# Patient Record
Sex: MALE | Race: WHITE | NOT HISPANIC OR LATINO | Employment: OTHER | URBAN - METROPOLITAN AREA
[De-identification: names, ages, dates, MRNs, and addresses within clinical notes are randomized per-mention and may not be internally consistent; named-entity substitution may affect disease eponyms.]

---

## 2023-06-19 ENCOUNTER — TELEPHONE (OUTPATIENT)
Dept: PRIMARY CARE | Facility: CLINIC | Age: 68
End: 2023-06-19
Payer: MEDICARE

## 2023-06-19 DIAGNOSIS — Z12.5 PROSTATE CANCER SCREENING: ICD-10-CM

## 2023-06-19 DIAGNOSIS — R73.9 HYPERGLYCEMIA: ICD-10-CM

## 2023-06-19 DIAGNOSIS — R53.83 FATIGUE, UNSPECIFIED TYPE: ICD-10-CM

## 2023-06-19 DIAGNOSIS — Z00.00 WELL ADULT EXAM: Primary | ICD-10-CM

## 2023-06-20 ENCOUNTER — LAB (OUTPATIENT)
Dept: LAB | Facility: LAB | Age: 68
End: 2023-06-20
Payer: MEDICARE

## 2023-06-20 ENCOUNTER — TELEPHONE (OUTPATIENT)
Dept: PRIMARY CARE | Facility: CLINIC | Age: 68
End: 2023-06-20

## 2023-06-20 DIAGNOSIS — D64.9 ANEMIA, UNSPECIFIED TYPE: Primary | ICD-10-CM

## 2023-06-20 DIAGNOSIS — R53.83 FATIGUE, UNSPECIFIED TYPE: ICD-10-CM

## 2023-06-20 DIAGNOSIS — R73.9 HYPERGLYCEMIA: ICD-10-CM

## 2023-06-20 DIAGNOSIS — Z12.5 PROSTATE CANCER SCREENING: ICD-10-CM

## 2023-06-20 DIAGNOSIS — Z00.00 WELL ADULT EXAM: ICD-10-CM

## 2023-06-20 LAB
ALANINE AMINOTRANSFERASE (SGPT) (U/L) IN SER/PLAS: 17 U/L (ref 10–52)
ALBUMIN (G/DL) IN SER/PLAS: 4.3 G/DL (ref 3.4–5)
ALKALINE PHOSPHATASE (U/L) IN SER/PLAS: 61 U/L (ref 33–136)
ANION GAP IN SER/PLAS: 11 MMOL/L (ref 10–20)
ASPARTATE AMINOTRANSFERASE (SGOT) (U/L) IN SER/PLAS: 15 U/L (ref 9–39)
BILIRUBIN TOTAL (MG/DL) IN SER/PLAS: 0.4 MG/DL (ref 0–1.2)
CALCIUM (MG/DL) IN SER/PLAS: 9.4 MG/DL (ref 8.6–10.3)
CARBON DIOXIDE, TOTAL (MMOL/L) IN SER/PLAS: 29 MMOL/L (ref 21–32)
CHLORIDE (MMOL/L) IN SER/PLAS: 104 MMOL/L (ref 98–107)
CHOLESTEROL (MG/DL) IN SER/PLAS: 194 MG/DL (ref 0–199)
CHOLESTEROL IN HDL (MG/DL) IN SER/PLAS: 46.2 MG/DL
CHOLESTEROL/HDL RATIO: 4.2
CREATININE (MG/DL) IN SER/PLAS: 1.07 MG/DL (ref 0.5–1.3)
ERYTHROCYTE DISTRIBUTION WIDTH (RATIO) BY AUTOMATED COUNT: 13.3 % (ref 11.5–14.5)
ERYTHROCYTE MEAN CORPUSCULAR HEMOGLOBIN CONCENTRATION (G/DL) BY AUTOMATED: 30.9 G/DL (ref 32–36)
ERYTHROCYTE MEAN CORPUSCULAR VOLUME (FL) BY AUTOMATED COUNT: 97 FL (ref 80–100)
ERYTHROCYTES (10*6/UL) IN BLOOD BY AUTOMATED COUNT: 4.06 X10E12/L (ref 4.5–5.9)
ESTIMATED AVERAGE GLUCOSE FOR HBA1C: 114 MG/DL
GFR MALE: 77 ML/MIN/1.73M2
GLUCOSE (MG/DL) IN SER/PLAS: 85 MG/DL (ref 74–99)
HEMATOCRIT (%) IN BLOOD BY AUTOMATED COUNT: 39.5 % (ref 41–52)
HEMOGLOBIN (G/DL) IN BLOOD: 12.2 G/DL (ref 13.5–17.5)
HEMOGLOBIN A1C/HEMOGLOBIN TOTAL IN BLOOD: 5.6 %
LDL: 116 MG/DL (ref 0–99)
LEUKOCYTES (10*3/UL) IN BLOOD BY AUTOMATED COUNT: 5.2 X10E9/L (ref 4.4–11.3)
PLATELETS (10*3/UL) IN BLOOD AUTOMATED COUNT: 288 X10E9/L (ref 150–450)
POTASSIUM (MMOL/L) IN SER/PLAS: 4.4 MMOL/L (ref 3.5–5.3)
PROSTATE SPECIFIC AG (NG/ML) IN SER/PLAS: 1.97 NG/ML (ref 0–4)
PROTEIN TOTAL: 6.9 G/DL (ref 6.4–8.2)
SODIUM (MMOL/L) IN SER/PLAS: 140 MMOL/L (ref 136–145)
THYROTROPIN (MIU/L) IN SER/PLAS BY DETECTION LIMIT <= 0.05 MIU/L: 2.4 MIU/L (ref 0.44–3.98)
TRIGLYCERIDE (MG/DL) IN SER/PLAS: 158 MG/DL (ref 0–149)
UREA NITROGEN (MG/DL) IN SER/PLAS: 19 MG/DL (ref 6–23)
VLDL: 32 MG/DL (ref 0–40)

## 2023-06-20 PROCEDURE — 83036 HEMOGLOBIN GLYCOSYLATED A1C: CPT

## 2023-06-20 PROCEDURE — 36415 COLL VENOUS BLD VENIPUNCTURE: CPT

## 2023-06-20 PROCEDURE — 85027 COMPLETE CBC AUTOMATED: CPT

## 2023-06-20 PROCEDURE — 80061 LIPID PANEL: CPT

## 2023-06-20 PROCEDURE — 82607 VITAMIN B-12: CPT

## 2023-06-20 PROCEDURE — 83540 ASSAY OF IRON: CPT

## 2023-06-20 PROCEDURE — G0103 PSA SCREENING: HCPCS

## 2023-06-20 PROCEDURE — 83550 IRON BINDING TEST: CPT

## 2023-06-20 PROCEDURE — 84443 ASSAY THYROID STIM HORMONE: CPT

## 2023-06-20 PROCEDURE — 80053 COMPREHEN METABOLIC PANEL: CPT

## 2023-06-20 PROCEDURE — 82728 ASSAY OF FERRITIN: CPT

## 2023-06-21 LAB
COBALAMIN (VITAMIN B12) (PG/ML) IN SER/PLAS: 2442 PG/ML (ref 211–911)
FERRITIN (UG/LL) IN SER/PLAS: 38 UG/L (ref 20–300)
IRON (UG/DL) IN SER/PLAS: 95 UG/DL (ref 35–150)
IRON BINDING CAPACITY (UG/DL) IN SER/PLAS: 391 UG/DL (ref 240–445)
IRON SATURATION (%) IN SER/PLAS: 24 % (ref 25–45)

## 2023-06-22 ENCOUNTER — OFFICE VISIT (OUTPATIENT)
Dept: PRIMARY CARE | Facility: CLINIC | Age: 68
End: 2023-06-22
Payer: MEDICARE

## 2023-06-22 VITALS
TEMPERATURE: 97.6 F | BODY MASS INDEX: 31.4 KG/M2 | WEIGHT: 212 LBS | HEART RATE: 82 BPM | HEIGHT: 69 IN | DIASTOLIC BLOOD PRESSURE: 76 MMHG | RESPIRATION RATE: 18 BRPM | SYSTOLIC BLOOD PRESSURE: 120 MMHG

## 2023-06-22 DIAGNOSIS — K21.9 GASTROESOPHAGEAL REFLUX DISEASE WITHOUT ESOPHAGITIS: ICD-10-CM

## 2023-06-22 DIAGNOSIS — G23.1 PSP (PROGRESSIVE SUPRANUCLEAR PALSY) (MULTI): ICD-10-CM

## 2023-06-22 DIAGNOSIS — G95.20 CERVICAL SPINAL CORD COMPRESSION (MULTI): ICD-10-CM

## 2023-06-22 DIAGNOSIS — Z00.00 WELCOME TO MEDICARE PREVENTIVE VISIT: Primary | ICD-10-CM

## 2023-06-22 DIAGNOSIS — N32.89 BLADDER SPASM: ICD-10-CM

## 2023-06-22 PROBLEM — M25.619 STIFFNESS OF SHOULDER JOINT: Status: RESOLVED | Noted: 2023-06-22 | Resolved: 2023-06-22

## 2023-06-22 PROBLEM — M41.9 SCOLIOSIS OF LUMBOSACRAL SPINE: Status: ACTIVE | Noted: 2023-06-22

## 2023-06-22 PROBLEM — E55.9 VITAMIN D DEFICIENCY: Status: ACTIVE | Noted: 2022-08-01

## 2023-06-22 PROBLEM — G95.9 CERVICAL MYELOPATHY (MULTI): Status: ACTIVE | Noted: 2023-06-22

## 2023-06-22 PROBLEM — M47.812 SPONDYLOSIS OF CERVICAL SPINE: Status: ACTIVE | Noted: 2023-06-22

## 2023-06-22 PROBLEM — R47.89: Status: ACTIVE | Noted: 2023-03-22

## 2023-06-22 PROBLEM — M25.562 LEFT MEDIAL KNEE PAIN: Status: RESOLVED | Noted: 2023-06-22 | Resolved: 2023-06-22

## 2023-06-22 PROBLEM — H61.20 IMPACTED CERUMEN: Status: RESOLVED | Noted: 2023-06-22 | Resolved: 2023-06-22

## 2023-06-22 PROBLEM — G56.03 CARPAL TUNNEL SYNDROME, BILATERAL: Status: ACTIVE | Noted: 2023-06-22

## 2023-06-22 PROBLEM — R13.10 DYSPHAGIA, UNSPECIFIED: Status: RESOLVED | Noted: 2023-06-22 | Resolved: 2023-06-22

## 2023-06-22 PROBLEM — J01.90 ACUTE SINUSITIS: Status: RESOLVED | Noted: 2023-06-22 | Resolved: 2023-06-22

## 2023-06-22 PROBLEM — H25.099 INCIPIENT SENILE CATARACT: Status: ACTIVE | Noted: 2023-06-22

## 2023-06-22 PROBLEM — M19.90 OSTEOARTHRITIS: Status: ACTIVE | Noted: 2023-06-22

## 2023-06-22 PROBLEM — M17.0 PRIMARY OSTEOARTHRITIS OF BOTH KNEES: Status: ACTIVE | Noted: 2017-12-18

## 2023-06-22 PROBLEM — R53.1 RIGHT SIDED WEAKNESS: Status: RESOLVED | Noted: 2023-06-22 | Resolved: 2023-06-22

## 2023-06-22 PROBLEM — R47.1 DYSARTHRIA: Status: ACTIVE | Noted: 2023-03-22

## 2023-06-22 PROBLEM — D50.9 IRON DEFICIENCY ANEMIA: Status: RESOLVED | Noted: 2020-10-16 | Resolved: 2023-06-22

## 2023-06-22 PROBLEM — M25.551 RIGHT HIP PAIN: Status: RESOLVED | Noted: 2017-12-18 | Resolved: 2023-06-22

## 2023-06-22 PROBLEM — R27.8 COORDINATION IMPAIRMENT: Status: ACTIVE | Noted: 2023-06-22

## 2023-06-22 PROBLEM — J31.0 RHINITIS: Status: RESOLVED | Noted: 2020-10-16 | Resolved: 2023-06-22

## 2023-06-22 PROBLEM — R29.818: Status: ACTIVE | Noted: 2023-03-22

## 2023-06-22 PROBLEM — T39.395A: Status: ACTIVE | Noted: 2023-06-22

## 2023-06-22 PROBLEM — R53.83 FATIGUE: Status: RESOLVED | Noted: 2020-10-16 | Resolved: 2023-06-22

## 2023-06-22 PROBLEM — M75.41 IMPINGEMENT SYNDROME OF RIGHT SHOULDER: Status: RESOLVED | Noted: 2023-06-22 | Resolved: 2023-06-22

## 2023-06-22 PROBLEM — G20.A1 HYPOKINETIC PARKINSONIAN DYSPHONIA (MULTI): Status: ACTIVE | Noted: 2023-03-22

## 2023-06-22 PROBLEM — Z98.1 STATUS POST CERVICAL SPINAL FUSION: Status: ACTIVE | Noted: 2023-06-22

## 2023-06-22 PROBLEM — M25.9 DISORDER OF SHOULDER: Status: ACTIVE | Noted: 2023-06-22

## 2023-06-22 PROBLEM — R41.89 COGNITIVE IMPAIRMENT: Status: ACTIVE | Noted: 2023-06-22

## 2023-06-22 PROBLEM — R73.03 PREDIABETES: Status: ACTIVE | Noted: 2022-08-01

## 2023-06-22 PROBLEM — L57.0 ACTINIC KERATOSIS: Status: RESOLVED | Noted: 2023-06-22 | Resolved: 2023-06-22

## 2023-06-22 PROBLEM — M17.12 OSTEOARTHRITIS OF LEFT KNEE: Status: ACTIVE | Noted: 2022-08-01

## 2023-06-22 PROBLEM — F51.04 CHRONIC INSOMNIA: Status: ACTIVE | Noted: 2023-06-22

## 2023-06-22 PROBLEM — H10.9 CONJUNCTIVITIS, LEFT EYE: Status: RESOLVED | Noted: 2023-06-22 | Resolved: 2023-06-22

## 2023-06-22 PROBLEM — R49.8 OTHER VOICE AND RESONANCE DISORDERS: Status: ACTIVE | Noted: 2023-06-22

## 2023-06-22 PROBLEM — R49.0 HYPOKINETIC PARKINSONIAN DYSPHONIA (MULTI): Status: ACTIVE | Noted: 2023-03-22

## 2023-06-22 PROBLEM — R42 DIZZINESS: Status: RESOLVED | Noted: 2023-06-22 | Resolved: 2023-06-22

## 2023-06-22 PROBLEM — I45.10 RBBB (RIGHT BUNDLE BRANCH BLOCK): Status: ACTIVE | Noted: 2022-08-01

## 2023-06-22 PROBLEM — G24.9 DYSKINESIA: Status: ACTIVE | Noted: 2023-06-22

## 2023-06-22 PROBLEM — M48.02 CERVICAL SPINAL STENOSIS: Status: ACTIVE | Noted: 2022-08-01

## 2023-06-22 PROBLEM — D64.9 ANEMIA: Status: ACTIVE | Noted: 2023-06-22

## 2023-06-22 PROBLEM — F32.A DEPRESSION: Status: ACTIVE | Noted: 2020-10-16

## 2023-06-22 PROBLEM — R26.9 GAIT DISORDER: Status: ACTIVE | Noted: 2023-06-22

## 2023-06-22 PROBLEM — N45.1 EPIDIDYMITIS: Status: ACTIVE | Noted: 2023-06-22

## 2023-06-22 PROBLEM — M54.50 LOW BACK PAIN: Status: RESOLVED | Noted: 2020-10-16 | Resolved: 2023-06-22

## 2023-06-22 PROBLEM — L03.221 CELLULITIS OF NECK: Status: RESOLVED | Noted: 2023-06-22 | Resolved: 2023-06-22

## 2023-06-22 PROBLEM — M72.2 PLANTAR FASCIITIS, LEFT: Status: ACTIVE | Noted: 2023-06-22

## 2023-06-22 PROBLEM — R29.2 HOFFMAN SIGN PRESENT: Status: RESOLVED | Noted: 2023-06-22 | Resolved: 2023-06-22

## 2023-06-22 PROBLEM — M62.830 SPASM OF MUSCLE OF LOWER BACK: Status: RESOLVED | Noted: 2023-06-22 | Resolved: 2023-06-22

## 2023-06-22 PROBLEM — K25.9: Status: ACTIVE | Noted: 2023-06-22

## 2023-06-22 PROBLEM — M25.511 SHOULDER PAIN, RIGHT: Status: RESOLVED | Noted: 2020-10-16 | Resolved: 2023-06-22

## 2023-06-22 PROBLEM — R45.86 MOOD DISTURBANCE: Status: ACTIVE | Noted: 2023-06-22

## 2023-06-22 PROBLEM — H66.90 OTITIS MEDIA, ACUTE: Status: RESOLVED | Noted: 2023-06-22 | Resolved: 2023-06-22

## 2023-06-22 PROBLEM — R09.82 POST-NASAL DRIP: Status: RESOLVED | Noted: 2023-06-22 | Resolved: 2023-06-22

## 2023-06-22 PROBLEM — M17.10 ARTHRITIS OF KNEE: Status: RESOLVED | Noted: 2023-06-22 | Resolved: 2023-06-22

## 2023-06-22 PROBLEM — M16.11 OSTEOARTHRITIS OF RIGHT HIP: Status: ACTIVE | Noted: 2022-08-01

## 2023-06-22 PROBLEM — H51.11 CONVERGENCE INSUFFICIENCY: Status: ACTIVE | Noted: 2023-06-22

## 2023-06-22 PROBLEM — M54.2 NECK PAIN: Status: RESOLVED | Noted: 2023-06-22 | Resolved: 2023-06-22

## 2023-06-22 PROBLEM — M19.019 OSTEOARTHRITIS OF GLENOHUMERAL JOINT: Status: ACTIVE | Noted: 2017-12-18

## 2023-06-22 PROBLEM — E66.9 OBESITY: Status: ACTIVE | Noted: 2023-06-22

## 2023-06-22 PROBLEM — R47.9 DIFFICULTY WITH SPEECH: Status: ACTIVE | Noted: 2023-06-22

## 2023-06-22 PROCEDURE — 1126F AMNT PAIN NOTED NONE PRSNT: CPT | Performed by: FAMILY MEDICINE

## 2023-06-22 PROCEDURE — 1170F FXNL STATUS ASSESSED: CPT | Performed by: FAMILY MEDICINE

## 2023-06-22 PROCEDURE — 1159F MED LIST DOCD IN RCRD: CPT | Performed by: FAMILY MEDICINE

## 2023-06-22 PROCEDURE — G0402 INITIAL PREVENTIVE EXAM: HCPCS | Performed by: FAMILY MEDICINE

## 2023-06-22 PROCEDURE — 1036F TOBACCO NON-USER: CPT | Performed by: FAMILY MEDICINE

## 2023-06-22 PROCEDURE — 99213 OFFICE O/P EST LOW 20 MIN: CPT | Performed by: FAMILY MEDICINE

## 2023-06-22 PROCEDURE — 3008F BODY MASS INDEX DOCD: CPT | Performed by: FAMILY MEDICINE

## 2023-06-22 RX ORDER — CARBIDOPA AND LEVODOPA 25; 100 MG/1; MG/1
TABLET ORAL
COMMUNITY
End: 2023-06-22 | Stop reason: ALTCHOICE

## 2023-06-22 RX ORDER — ACETAMINOPHEN 500 MG
2 TABLET ORAL 3 TIMES DAILY
COMMUNITY
Start: 2022-08-15

## 2023-06-22 RX ORDER — OMEPRAZOLE 40 MG/1
40 CAPSULE, DELAYED RELEASE ORAL
Qty: 90 CAPSULE | Refills: 3 | Status: SHIPPED | OUTPATIENT
Start: 2023-06-22 | End: 2024-04-10

## 2023-06-22 RX ORDER — MULTIVIT WITH MINERALS/HERBS
TABLET ORAL
COMMUNITY
End: 2023-06-22 | Stop reason: ALTCHOICE

## 2023-06-22 RX ORDER — IBUPROFEN 100 MG/5ML
SUSPENSION, ORAL (FINAL DOSE FORM) ORAL
COMMUNITY

## 2023-06-22 RX ORDER — GLUCOSAMINE/CHONDR SU A SOD 750-600 MG
TABLET ORAL
COMMUNITY
End: 2023-06-22 | Stop reason: ALTCHOICE

## 2023-06-22 RX ORDER — SERTRALINE HYDROCHLORIDE 50 MG/1
TABLET, FILM COATED ORAL
COMMUNITY
Start: 2022-12-08 | End: 2023-06-22 | Stop reason: ALTCHOICE

## 2023-06-22 RX ORDER — OMEPRAZOLE 20 MG/1
TABLET, DELAYED RELEASE ORAL
COMMUNITY
End: 2023-06-22 | Stop reason: ALTCHOICE

## 2023-06-22 RX ORDER — DEXTROMETHORPHAN HYDROBROMIDE, GUAIFENESIN 5; 100 MG/5ML; MG/5ML
LIQUID ORAL EVERY 8 HOURS PRN
COMMUNITY

## 2023-06-22 RX ORDER — GABAPENTIN 300 MG/1
CAPSULE ORAL
COMMUNITY
Start: 2021-03-23 | End: 2023-10-17 | Stop reason: SDUPTHER

## 2023-06-22 RX ORDER — CETIRIZINE HYDROCHLORIDE 10 MG/1
1 TABLET ORAL DAILY
COMMUNITY
Start: 2020-10-02

## 2023-06-22 RX ORDER — HYOSCYAMINE SULFATE 0.125 MG
0.12 TABLET ORAL 2 TIMES DAILY
Qty: 20 TABLET | Refills: 0 | Status: SHIPPED | OUTPATIENT
Start: 2023-06-22 | End: 2023-08-04 | Stop reason: SDUPTHER

## 2023-06-22 RX ORDER — OMEPRAZOLE 40 MG/1
40 CAPSULE, DELAYED RELEASE ORAL
COMMUNITY
End: 2023-06-22 | Stop reason: SDUPTHER

## 2023-06-22 RX ORDER — RIVASTIGMINE 4.6 MG/24H
1 PATCH, EXTENDED RELEASE TRANSDERMAL DAILY
COMMUNITY
Start: 2022-12-08 | End: 2023-06-22 | Stop reason: ALTCHOICE

## 2023-06-22 ASSESSMENT — ENCOUNTER SYMPTOMS
HEMATOLOGIC/LYMPHATIC NEGATIVE: 1
RESPIRATORY NEGATIVE: 1
CONSTITUTIONAL NEGATIVE: 1
PSYCHIATRIC NEGATIVE: 1
GASTROINTESTINAL NEGATIVE: 1
ALLERGIC/IMMUNOLOGIC NEGATIVE: 1
DIZZINESS: 1
ARTHRALGIAS: 1
WEAKNESS: 1
SPEECH DIFFICULTY: 1
ENDOCRINE NEGATIVE: 1
FREQUENCY: 1
LIGHT-HEADEDNESS: 1
CARDIOVASCULAR NEGATIVE: 1
MYALGIAS: 1

## 2023-06-22 ASSESSMENT — ACTIVITIES OF DAILY LIVING (ADL)
TAKING_MEDICATION: INDEPENDENT
BATHING: INDEPENDENT
DRESSING: INDEPENDENT
MANAGING_FINANCES: INDEPENDENT
DOING_HOUSEWORK: NEEDS ASSISTANCE
GROCERY_SHOPPING: INDEPENDENT

## 2023-06-22 ASSESSMENT — PATIENT HEALTH QUESTIONNAIRE - PHQ9
SUM OF ALL RESPONSES TO PHQ9 QUESTIONS 1 AND 2: 0
1. LITTLE INTEREST OR PLEASURE IN DOING THINGS: NOT AT ALL
2. FEELING DOWN, DEPRESSED OR HOPELESS: NOT AT ALL

## 2023-06-22 NOTE — PROGRESS NOTES
"Subjective   Reason for Visit: Jony Panda is an 67 y.o. male here for a Medicare Wellness visit.     Past Medical, Surgical, and Family History reviewed and updated in chart.    Reviewed all medications by prescribing practitioner or clinical pharmacist (such as prescriptions, OTCs, herbal therapies and supplements) and documented in the medical record.    HPI    Patient Care Team:  Eulogio Duvall DO as PCP - General     Review of Systems   Constitutional: Negative.    HENT: Negative.     Eyes:  Positive for visual disturbance.   Respiratory: Negative.     Cardiovascular: Negative.    Gastrointestinal: Negative.    Endocrine: Negative.    Genitourinary:  Positive for frequency and urgency.   Musculoskeletal:  Positive for arthralgias and myalgias.   Skin: Negative.    Allergic/Immunologic: Negative.    Neurological:  Positive for dizziness, speech difficulty, weakness and light-headedness.   Hematological: Negative.    Psychiatric/Behavioral: Negative.         Objective   Vitals:  /76   Pulse 82   Temp 36.4 °C (97.6 °F)   Resp 18   Ht 1.753 m (5' 9\")   Wt 96.2 kg (212 lb)   BMI 31.31 kg/m²       Physical Exam  Vitals and nursing note reviewed.   Constitutional:       Appearance: Normal appearance.   HENT:      Head: Normocephalic and atraumatic.      Nose: Nose normal.      Mouth/Throat:      Pharynx: Oropharynx is clear.   Eyes:      Extraocular Movements: Extraocular movements intact.      Conjunctiva/sclera: Conjunctivae normal.      Pupils: Pupils are equal, round, and reactive to light.   Neck:      Vascular: No carotid bruit.   Cardiovascular:      Rate and Rhythm: Normal rate and regular rhythm.      Pulses: Normal pulses.      Heart sounds: Normal heart sounds.   Pulmonary:      Effort: Pulmonary effort is normal. No respiratory distress.      Breath sounds: Normal breath sounds. No wheezing, rhonchi or rales.   Abdominal:      General: Abdomen is flat. Bowel sounds are normal. There is no " distension.      Palpations: Abdomen is soft.      Tenderness: There is no abdominal tenderness.      Hernia: No hernia is present.   Musculoskeletal:         General: No swelling or tenderness. Normal range of motion.      Cervical back: Normal range of motion and neck supple. No tenderness.   Lymphadenopathy:      Cervical: No cervical adenopathy.   Skin:     General: Skin is warm and dry.      Capillary Refill: Capillary refill takes less than 2 seconds.   Neurological:      Mental Status: He is alert and oriented to person, place, and time.      Cranial Nerves: No cranial nerve deficit.      Motor: Weakness present.      Coordination: Coordination abnormal.      Gait: Gait abnormal.   Psychiatric:         Attention and Perception: Attention and perception normal.         Mood and Affect: Mood normal.         Behavior: Behavior normal.         Thought Content: Thought content normal.         Judgment: Judgment normal.         Assessment/Plan   1. Medicare annual wellness visit, subsequent      2. Well adult exam      3. Gastroesophageal reflux disease without esophagitis    - omeprazole (PriLOSEC) 40 mg DR capsule; Take 1 capsule (40 mg) by mouth once daily.  Dispense: 90 capsule; Refill: 3    4. Bladder spasm    - hyoscyamine (Anaspaz, Levsin) 0.125 mg tablet; Take 1 tablet (0.125 mg) by mouth 2 times a day for 10 days.  Dispense: 20 tablet; Refill: 0    5. PSP (progressive supranuclear palsy) (CMS/HCC)      6. Cervical spinal cord compression (CMS/HCC)

## 2023-08-04 ENCOUNTER — TELEPHONE (OUTPATIENT)
Dept: PEDIATRICS | Facility: CLINIC | Age: 68
End: 2023-08-04
Payer: MEDICARE

## 2023-08-04 DIAGNOSIS — N32.89 BLADDER SPASM: ICD-10-CM

## 2023-08-04 DIAGNOSIS — K21.9 GASTROESOPHAGEAL REFLUX DISEASE WITHOUT ESOPHAGITIS: ICD-10-CM

## 2023-08-04 NOTE — TELEPHONE ENCOUNTER
Spoke with wife. Prilosec was sent to [facility] wife is calling to see what happened.    Requesting hycosamine sent to local pharmacy.    Please review and advise    Requested Prescriptions     Pending Prescriptions Disp Refills    hyoscyamine (Anaspaz, Levsin) 0.125 mg tablet 20 tablet 0     Sig: Take 1 tablet (0.125 mg) by mouth 2 times a day for 10 days.

## 2023-08-04 NOTE — TELEPHONE ENCOUNTER
Patient wife call patient had an appointment in June, was told by patient wife that he was supposed to get 2 scripts for bladder/belly issues for him, patient wife said that she called a few times and no one has called her back. Thank you. Please call her back [phone]

## 2023-08-07 RX ORDER — HYOSCYAMINE SULFATE 0.125 MG
0.12 TABLET ORAL 2 TIMES DAILY
Qty: 20 TABLET | Refills: 3 | Status: SHIPPED | OUTPATIENT
Start: 2023-08-07 | End: 2023-08-21 | Stop reason: SDUPTHER

## 2023-08-14 ENCOUNTER — TELEPHONE (OUTPATIENT)
Dept: PRIMARY CARE | Facility: CLINIC | Age: 68
End: 2023-08-14
Payer: MEDICARE

## 2023-08-14 NOTE — TELEPHONE ENCOUNTER
Pt wife called and said Jony was seen on June 22nd for a welcome to medicare PX but visit was billed as a Annual Medicare PX. Can this be changed and resubmitted to insurance?

## 2023-08-21 DIAGNOSIS — N32.89 BLADDER SPASM: ICD-10-CM

## 2023-08-21 RX ORDER — HYOSCYAMINE SULFATE 0.125 MG
0.12 TABLET ORAL 2 TIMES DAILY
Qty: 20 TABLET | Refills: 3 | Status: SHIPPED | OUTPATIENT
Start: 2023-08-21 | End: 2023-09-30

## 2023-08-21 NOTE — TELEPHONE ENCOUNTER
Requested Prescriptions     Pending Prescriptions Disp Refills    hyoscyamine (Anaspaz, Levsin) 0.125 mg tablet 20 tablet 3     Sig: Take 1 tablet (0.125 mg) by mouth 2 times a day.

## 2023-09-11 ENCOUNTER — TELEPHONE (OUTPATIENT)
Dept: PRIMARY CARE | Facility: CLINIC | Age: 68
End: 2023-09-11
Payer: MEDICARE

## 2023-09-11 DIAGNOSIS — N32.89 BLADDER SPASM: Primary | ICD-10-CM

## 2023-09-11 RX ORDER — DICYCLOMINE HYDROCHLORIDE 20 MG/1
20 TABLET ORAL 2 TIMES DAILY
Qty: 180 TABLET | Refills: 3 | Status: SHIPPED | OUTPATIENT
Start: 2023-09-11 | End: 2024-09-10

## 2023-09-11 NOTE — TELEPHONE ENCOUNTER
Pt wife called (Katya) and Hyoscyamine is not covered by insurance, Dicyclomine is covered. Can this please be sent in to mail order pharm?

## 2023-09-25 ENCOUNTER — TELEPHONE (OUTPATIENT)
Dept: PRIMARY CARE | Facility: CLINIC | Age: 68
End: 2023-09-25
Payer: MEDICARE

## 2023-09-25 ENCOUNTER — TELEPHONE (OUTPATIENT)
Dept: PRIMARY CARE | Facility: CLINIC | Age: 68
End: 2023-09-25

## 2023-09-25 DIAGNOSIS — N32.89 BLADDER SPASM: Primary | ICD-10-CM

## 2023-09-25 RX ORDER — SOLIFENACIN SUCCINATE 5 MG/1
5 TABLET, FILM COATED ORAL DAILY
Qty: 30 TABLET | Refills: 11 | Status: SHIPPED | OUTPATIENT
Start: 2023-09-25 | End: 2023-10-05 | Stop reason: ALTCHOICE

## 2023-09-25 NOTE — TELEPHONE ENCOUNTER
Patient was seen by Neuro and he recommended his Hyoscyamine be switched to either Vesicare, Trostium or Mybertriq. He feels one of these will work better.  Can this be changed and sent in?

## 2023-10-05 ENCOUNTER — TELEPHONE (OUTPATIENT)
Dept: PRIMARY CARE | Facility: CLINIC | Age: 68
End: 2023-10-05
Payer: MEDICARE

## 2023-10-05 DIAGNOSIS — N32.89 BLADDER SPASM: Primary | ICD-10-CM

## 2023-10-05 RX ORDER — MIRABEGRON 25 MG/1
25 TABLET, FILM COATED, EXTENDED RELEASE ORAL DAILY
Qty: 30 TABLET | Refills: 1 | Status: SHIPPED | OUTPATIENT
Start: 2023-10-05 | End: 2023-12-04

## 2023-10-05 NOTE — TELEPHONE ENCOUNTER
Patients wife called in, stating that Jony started taking Solifenacin 5mg and it makes him very nauseated. Would like to know if pt can stop taking Solifenacin and try Myrbetriq instead.

## 2023-10-12 ENCOUNTER — TELEPHONE (OUTPATIENT)
Dept: PEDIATRICS | Facility: CLINIC | Age: 68
End: 2023-10-12
Payer: MEDICARE

## 2023-10-12 DIAGNOSIS — N32.89 BLADDER SPASM: Primary | ICD-10-CM

## 2023-10-12 RX ORDER — TROSPIUM CHLORIDE 20 MG/1
20 TABLET, FILM COATED ORAL 2 TIMES DAILY
Qty: 60 TABLET | Refills: 11 | Status: SHIPPED | OUTPATIENT
Start: 2023-10-12 | End: 2023-11-06

## 2023-10-12 NOTE — TELEPHONE ENCOUNTER
Patients wife called in stating that the Mybetriq that was sent in for patients bladder issues, is too strong and makes patient nauseous. Asked if patient is able to have an order for Trospium sent in.     To [facility] in [place].

## 2023-10-16 RX ORDER — GABAPENTIN 300 MG/1
CAPSULE ORAL
OUTPATIENT
Start: 2023-10-16

## 2023-10-17 DIAGNOSIS — G95.20 CERVICAL SPINAL CORD COMPRESSION (MULTI): Primary | ICD-10-CM

## 2023-10-17 RX ORDER — GABAPENTIN 300 MG/1
300 CAPSULE ORAL
Qty: 90 CAPSULE | Refills: 3 | OUTPATIENT
Start: 2023-10-17

## 2023-10-17 RX ORDER — GABAPENTIN 300 MG/1
300 CAPSULE ORAL NIGHTLY
Qty: 90 CAPSULE | Refills: 3 | Status: SHIPPED | OUTPATIENT
Start: 2023-10-17 | End: 2024-01-10 | Stop reason: SDUPTHER

## 2023-10-17 NOTE — TELEPHONE ENCOUNTER
Patient requests prescription below    Last Office Visit: 6/22/2023     Requested Prescriptions     Pending Prescriptions Disp Refills    gabapentin (Neurontin) 300 mg capsule 90 capsule 0     Sig: Take 1 capsule (300 mg) by mouth once daily at bedtime.

## 2023-12-13 ENCOUNTER — APPOINTMENT (OUTPATIENT)
Dept: PRIMARY CARE | Facility: CLINIC | Age: 68
End: 2023-12-13
Payer: MEDICARE

## 2024-01-10 DIAGNOSIS — G95.20 CERVICAL SPINAL CORD COMPRESSION (MULTI): ICD-10-CM

## 2024-01-10 RX ORDER — GABAPENTIN 300 MG/1
300 CAPSULE ORAL NIGHTLY
Qty: 90 CAPSULE | Refills: 3 | Status: SHIPPED | OUTPATIENT
Start: 2024-01-10

## 2024-01-10 NOTE — TELEPHONE ENCOUNTER
Requested Prescriptions     Pending Prescriptions Disp Refills    gabapentin (Neurontin) 300 mg capsule 90 capsule 3     Sig: Take 1 capsule (300 mg) by mouth once daily at bedtime.

## 2024-01-16 ENCOUNTER — TELEPHONE (OUTPATIENT)
Dept: PRIMARY CARE | Facility: CLINIC | Age: 69
End: 2024-01-16
Payer: MEDICARE

## 2024-01-16 DIAGNOSIS — L60.9 NAIL ABNORMALITIES: Primary | ICD-10-CM

## 2024-01-16 NOTE — TELEPHONE ENCOUNTER
Patient would like a referral to Podiatry for Nail care.    [facility]  Dr Esparza  Fax [phone]

## 2024-03-07 ENCOUNTER — HOME HEALTH ADMISSION (OUTPATIENT)
Dept: HOME HEALTH SERVICES | Facility: HOME HEALTH | Age: 69
End: 2024-03-07
Payer: MEDICARE

## 2024-03-08 ENCOUNTER — DOCUMENTATION (OUTPATIENT)
Dept: HOME HEALTH SERVICES | Facility: HOME HEALTH | Age: 69
End: 2024-03-08
Payer: MEDICARE

## 2024-03-08 NOTE — HH CARE COORDINATION
Home Care received a Referral for Nursing, Physical Therapy, Home Health Aide, and Speech Language Pathology. We have processed the referral for a Start of Care on 03.11.24 to 03.12.24.     If you have any questions or concerns, please feel free to contact us at [phone]. Follow the prompts, enter your five digit zip code, and you will be directed to your care team on [facility] 2.

## 2024-03-11 ENCOUNTER — HOME CARE VISIT (OUTPATIENT)
Dept: HOME HEALTH SERVICES | Facility: HOME HEALTH | Age: 69
End: 2024-03-11
Payer: MEDICARE

## 2024-03-11 VITALS
DIASTOLIC BLOOD PRESSURE: 68 MMHG | OXYGEN SATURATION: 99 % | HEART RATE: 68 BPM | RESPIRATION RATE: 20 BRPM | SYSTOLIC BLOOD PRESSURE: 118 MMHG | TEMPERATURE: 96.6 F

## 2024-03-11 PROCEDURE — 169592 NO-PAY CLAIM PROCEDURE

## 2024-03-11 PROCEDURE — G0299 HHS/HOSPICE OF RN EA 15 MIN: HCPCS | Mod: HHH

## 2024-03-11 PROCEDURE — 0023 HH SOC

## 2024-03-11 PROCEDURE — 1090000001 HH PPS REVENUE CREDIT

## 2024-03-11 PROCEDURE — 1090000002 HH PPS REVENUE DEBIT

## 2024-03-11 ASSESSMENT — PAIN SCALES - PAIN ASSESSMENT IN ADVANCED DEMENTIA (PAINAD)
TOTALSCORE: 0
CONSOLABILITY: 0
BODYLANGUAGE: 0
FACIALEXPRESSION: 0
BREATHING: 0
CONSOLABILITY: 0 - NO NEED TO CONSOLE.
NEGVOCALIZATION: 0
BODYLANGUAGE: 0 - RELAXED.
FACIALEXPRESSION: 0 - SMILING OR INEXPRESSIVE.
NEGVOCALIZATION: 0 - NONE.

## 2024-03-11 ASSESSMENT — ENCOUNTER SYMPTOMS
OCCASIONAL FEELINGS OF UNSTEADINESS: 0
HIGHEST PAIN SEVERITY IN PAST 24 HOURS: 2/10
PAIN SEVERITY GOAL: 0/10
PERSON REPORTING PAIN: PATIENT
PAIN: 1
LOWEST PAIN SEVERITY IN PAST 24 HOURS: 0/10
DEPRESSION: 0
APPETITE LEVEL: FAIR
PAIN LOCATION: BACK
PAIN LOCATION - PAIN FREQUENCY: INTERMITTENT
LOSS OF SENSATION IN FEET: 0
PAIN LOCATION - PAIN QUALITY: ACHES
PAIN LOCATION - PAIN SEVERITY: 2/10
SUBJECTIVE PAIN PROGRESSION: UNCHANGED
CHANGE IN APPETITE: UNCHANGED
LAST BOWEL MOVEMENT: 66910

## 2024-03-11 ASSESSMENT — ACTIVITIES OF DAILY LIVING (ADL)
OASIS_M1830: 05
ENTERING_EXITING_HOME: MODERATE ASSIST

## 2024-03-11 NOTE — HOME HEALTH
SKILLED NURSING VISIT FOR ADMISSION [facility] ASSESSMENT TEACHING OF MEDICATIONS AND DISEASE PROCESS. PT LIVES IN [facility] AND AMBULATES WITH WALKER. SPOUSE ASSISTS WITH ADLS DECLINES [facility] AIDE AT THIS TIME AND HAS HIRED CAREGIVER 2 X WEEK. PT AND SPOUSE ACCEPT PT OT AND SLP SERVICES. PT AND SPOUSE HAVE NO SKILLED NURSING NEEDS AT THIS TIME. SPOUSE REQUESTS STAFF CONTACT HER [phone] TO SCHEDULE VISITS.

## 2024-03-12 ENCOUNTER — HOME CARE VISIT (OUTPATIENT)
Dept: HOME HEALTH SERVICES | Facility: HOME HEALTH | Age: 69
End: 2024-03-12
Payer: MEDICARE

## 2024-03-12 VITALS — HEART RATE: 72 BPM | TEMPERATURE: 97.2 F | SYSTOLIC BLOOD PRESSURE: 120 MMHG | DIASTOLIC BLOOD PRESSURE: 80 MMHG

## 2024-03-12 PROCEDURE — G0151 HHCP-SERV OF PT,EA 15 MIN: HCPCS | Mod: HHH

## 2024-03-12 PROCEDURE — 1090000001 HH PPS REVENUE CREDIT

## 2024-03-12 PROCEDURE — 1090000002 HH PPS REVENUE DEBIT

## 2024-03-12 SDOH — HEALTH STABILITY: PHYSICAL HEALTH: EXERCISE COMMENTS: INSTR IN SEATED COORDINATION/NEURO ACTIVITIES/FRENKEL BASED

## 2024-03-12 ASSESSMENT — ENCOUNTER SYMPTOMS
DENIES PAIN: 1
OCCASIONAL FEELINGS OF UNSTEADINESS: 1

## 2024-03-12 ASSESSMENT — ACTIVITIES OF DAILY LIVING (ADL)
AMBULATION ASSISTANCE ON FLAT SURFACES: 1
AMBULATION_DISTANCE/DURATION_TOLERATED: 150

## 2024-03-13 ENCOUNTER — HOME CARE VISIT (OUTPATIENT)
Dept: HOME HEALTH SERVICES | Facility: HOME HEALTH | Age: 69
End: 2024-03-13
Payer: MEDICARE

## 2024-03-13 PROCEDURE — 1090000001 HH PPS REVENUE CREDIT

## 2024-03-13 PROCEDURE — 1090000002 HH PPS REVENUE DEBIT

## 2024-03-13 PROCEDURE — G0157 HHC PT ASSISTANT EA 15: HCPCS | Mod: HHH

## 2024-03-13 PROCEDURE — G0152 HHCP-SERV OF OT,EA 15 MIN: HCPCS | Mod: HHH

## 2024-03-13 ASSESSMENT — ACTIVITIES OF DAILY LIVING (ADL)
DRESSING_UB_CURRENT_FUNCTION: MINIMUM ASSIST
PHYSICAL TRANSFERS ASSESSED: 1
TOILETING: 1
AMBULATION ASSISTANCE: CONTACT GUARD ASSIST
AMBULATION ASSISTANCE: 1
TOILETING: MINIMUM ASSIST
CURRENT_FUNCTION: MINIMUM ASSIST

## 2024-03-13 ASSESSMENT — ENCOUNTER SYMPTOMS
PERSON REPORTING PAIN: PATIENT
DENIES PAIN: 1

## 2024-03-14 PROCEDURE — 1090000002 HH PPS REVENUE DEBIT

## 2024-03-14 PROCEDURE — 1090000001 HH PPS REVENUE CREDIT

## 2024-03-15 ENCOUNTER — HOME CARE VISIT (OUTPATIENT)
Dept: HOME HEALTH SERVICES | Facility: HOME HEALTH | Age: 69
End: 2024-03-15
Payer: MEDICARE

## 2024-03-15 VITALS — HEART RATE: 69 BPM | OXYGEN SATURATION: 100 % | TEMPERATURE: 97.9 F

## 2024-03-15 PROCEDURE — 1090000001 HH PPS REVENUE CREDIT

## 2024-03-15 PROCEDURE — G0153 HHCP-SVS OF S/L PATH,EA 15MN: HCPCS | Mod: HHH

## 2024-03-15 PROCEDURE — 1090000002 HH PPS REVENUE DEBIT

## 2024-03-15 ASSESSMENT — PAIN SCALES - PAIN ASSESSMENT IN ADVANCED DEMENTIA (PAINAD)
TOTALSCORE: 0
BODYLANGUAGE: 0
BODYLANGUAGE: 0 - RELAXED.
NEGVOCALIZATION: 0
NEGVOCALIZATION: 0 - NONE.
CONSOLABILITY: 0
BREATHING: 0
FACIALEXPRESSION: 0
FACIALEXPRESSION: 0 - SMILING OR INEXPRESSIVE.
CONSOLABILITY: 0 - NO NEED TO CONSOLE.

## 2024-03-15 ASSESSMENT — ENCOUNTER SYMPTOMS
PERSON REPORTING PAIN: PATIENT
AGGRESSION WITHIN DEFINED LIMITS: 1
ANGER WITHIN DEFINED LIMITS: 1
DENIES PAIN: 1

## 2024-03-16 PROCEDURE — 1090000001 HH PPS REVENUE CREDIT

## 2024-03-16 PROCEDURE — 1090000002 HH PPS REVENUE DEBIT

## 2024-03-17 PROCEDURE — 1090000001 HH PPS REVENUE CREDIT

## 2024-03-17 PROCEDURE — 1090000002 HH PPS REVENUE DEBIT

## 2024-03-18 PROCEDURE — 1090000002 HH PPS REVENUE DEBIT

## 2024-03-18 PROCEDURE — 1090000001 HH PPS REVENUE CREDIT

## 2024-03-19 ENCOUNTER — HOME CARE VISIT (OUTPATIENT)
Dept: HOME HEALTH SERVICES | Facility: HOME HEALTH | Age: 69
End: 2024-03-19
Payer: MEDICARE

## 2024-03-19 PROCEDURE — G0157 HHC PT ASSISTANT EA 15: HCPCS | Mod: HHH

## 2024-03-19 PROCEDURE — 1090000001 HH PPS REVENUE CREDIT

## 2024-03-19 PROCEDURE — 1090000002 HH PPS REVENUE DEBIT

## 2024-03-19 ASSESSMENT — ENCOUNTER SYMPTOMS
PERSON REPORTING PAIN: PATIENT
DENIES PAIN: 1

## 2024-03-20 ENCOUNTER — HOME CARE VISIT (OUTPATIENT)
Dept: HOME HEALTH SERVICES | Facility: HOME HEALTH | Age: 69
End: 2024-03-20
Payer: MEDICARE

## 2024-03-20 PROCEDURE — 1090000002 HH PPS REVENUE DEBIT

## 2024-03-20 PROCEDURE — 1090000001 HH PPS REVENUE CREDIT

## 2024-03-20 PROCEDURE — G0153 HHCP-SVS OF S/L PATH,EA 15MN: HCPCS | Mod: HHH

## 2024-03-20 PROCEDURE — G0158 HHC OT ASSISTANT EA 15: HCPCS | Mod: HHH

## 2024-03-20 ASSESSMENT — PAIN SCALES - PAIN ASSESSMENT IN ADVANCED DEMENTIA (PAINAD)
BREATHING: 0
CONSOLABILITY: 0
FACIALEXPRESSION: 0 - SMILING OR INEXPRESSIVE.
BODYLANGUAGE: 0
BODYLANGUAGE: 0 - RELAXED.
FACIALEXPRESSION: 0
TOTALSCORE: 0
NEGVOCALIZATION: 0
CONSOLABILITY: 0 - NO NEED TO CONSOLE.
NEGVOCALIZATION: 0 - NONE.

## 2024-03-20 ASSESSMENT — ENCOUNTER SYMPTOMS
DENIES PAIN: 1
PERSON REPORTING PAIN: PATIENT

## 2024-03-21 VITALS — OXYGEN SATURATION: 96 % | HEART RATE: 74 BPM

## 2024-03-21 PROCEDURE — 1090000002 HH PPS REVENUE DEBIT

## 2024-03-21 PROCEDURE — 1090000001 HH PPS REVENUE CREDIT

## 2024-03-21 ASSESSMENT — ENCOUNTER SYMPTOMS
DENIES PAIN: 1
PERSON REPORTING PAIN: PATIENT

## 2024-03-22 ENCOUNTER — HOME CARE VISIT (OUTPATIENT)
Dept: HOME HEALTH SERVICES | Facility: HOME HEALTH | Age: 69
End: 2024-03-22
Payer: MEDICARE

## 2024-03-22 VITALS — HEART RATE: 68 BPM | OXYGEN SATURATION: 95 %

## 2024-03-22 VITALS — HEART RATE: 72 BPM | OXYGEN SATURATION: 98 %

## 2024-03-22 PROCEDURE — 1090000001 HH PPS REVENUE CREDIT

## 2024-03-22 PROCEDURE — G0153 HHCP-SVS OF S/L PATH,EA 15MN: HCPCS | Mod: HHH

## 2024-03-22 PROCEDURE — 1090000002 HH PPS REVENUE DEBIT

## 2024-03-22 PROCEDURE — G0158 HHC OT ASSISTANT EA 15: HCPCS | Mod: HHH

## 2024-03-22 ASSESSMENT — PAIN SCALES - PAIN ASSESSMENT IN ADVANCED DEMENTIA (PAINAD)
BREATHING: 0
FACIALEXPRESSION: 0 - SMILING OR INEXPRESSIVE.
NEGVOCALIZATION: 0 - NONE.
BODYLANGUAGE: 0 - RELAXED.
CONSOLABILITY: 0
CONSOLABILITY: 0 - NO NEED TO CONSOLE.
NEGVOCALIZATION: 0
BODYLANGUAGE: 0
TOTALSCORE: 0
FACIALEXPRESSION: 0

## 2024-03-22 ASSESSMENT — ENCOUNTER SYMPTOMS
DENIES PAIN: 1
DENIES PAIN: 1
PERSON REPORTING PAIN: PATIENT
PERSON REPORTING PAIN: PATIENT

## 2024-03-23 PROCEDURE — 1090000002 HH PPS REVENUE DEBIT

## 2024-03-23 PROCEDURE — 1090000001 HH PPS REVENUE CREDIT

## 2024-03-24 PROCEDURE — 1090000002 HH PPS REVENUE DEBIT

## 2024-03-24 PROCEDURE — 1090000001 HH PPS REVENUE CREDIT

## 2024-03-25 ENCOUNTER — HOME CARE VISIT (OUTPATIENT)
Dept: HOME HEALTH SERVICES | Facility: HOME HEALTH | Age: 69
End: 2024-03-25
Payer: MEDICARE

## 2024-03-25 VITALS — OXYGEN SATURATION: 97 % | HEART RATE: 63 BPM | TEMPERATURE: 98.1 F

## 2024-03-25 VITALS — OXYGEN SATURATION: 96 % | HEART RATE: 68 BPM

## 2024-03-25 PROCEDURE — G0153 HHCP-SVS OF S/L PATH,EA 15MN: HCPCS | Mod: HHH

## 2024-03-25 PROCEDURE — 1090000002 HH PPS REVENUE DEBIT

## 2024-03-25 PROCEDURE — 1090000001 HH PPS REVENUE CREDIT

## 2024-03-25 PROCEDURE — G0158 HHC OT ASSISTANT EA 15: HCPCS | Mod: HHH

## 2024-03-25 ASSESSMENT — PAIN SCALES - PAIN ASSESSMENT IN ADVANCED DEMENTIA (PAINAD)
TOTALSCORE: 0
NEGVOCALIZATION: 0 - NONE.
BODYLANGUAGE: 0
BREATHING: 0
FACIALEXPRESSION: 0 - SMILING OR INEXPRESSIVE.
FACIALEXPRESSION: 0
CONSOLABILITY: 0 - NO NEED TO CONSOLE.
NEGVOCALIZATION: 0
BODYLANGUAGE: 0 - RELAXED.
CONSOLABILITY: 0

## 2024-03-25 ASSESSMENT — ENCOUNTER SYMPTOMS
PERSON REPORTING PAIN: PATIENT
PERSON REPORTING PAIN: PATIENT
DENIES PAIN: 1
DENIES PAIN: 1

## 2024-03-26 ENCOUNTER — HOME CARE VISIT (OUTPATIENT)
Dept: HOME HEALTH SERVICES | Facility: HOME HEALTH | Age: 69
End: 2024-03-26
Payer: MEDICARE

## 2024-03-26 PROCEDURE — 1090000001 HH PPS REVENUE CREDIT

## 2024-03-26 PROCEDURE — 1090000002 HH PPS REVENUE DEBIT

## 2024-03-26 PROCEDURE — G0157 HHC PT ASSISTANT EA 15: HCPCS | Mod: HHH

## 2024-03-27 ENCOUNTER — HOME CARE VISIT (OUTPATIENT)
Dept: HOME HEALTH SERVICES | Facility: HOME HEALTH | Age: 69
End: 2024-03-27
Payer: MEDICARE

## 2024-03-27 ENCOUNTER — HOME CARE VISIT (OUTPATIENT)
Dept: HOME HEALTH SERVICES | Facility: HOME HEALTH | Age: 69
End: 2024-03-27

## 2024-03-27 PROCEDURE — 1090000002 HH PPS REVENUE DEBIT

## 2024-03-27 PROCEDURE — G0153 HHCP-SVS OF S/L PATH,EA 15MN: HCPCS | Mod: HHH

## 2024-03-27 PROCEDURE — G0158 HHC OT ASSISTANT EA 15: HCPCS | Mod: HHH

## 2024-03-27 PROCEDURE — 1090000001 HH PPS REVENUE CREDIT

## 2024-03-27 ASSESSMENT — PAIN SCALES - PAIN ASSESSMENT IN ADVANCED DEMENTIA (PAINAD)
NEGVOCALIZATION: 0
NEGVOCALIZATION: 0 - NONE.
TOTALSCORE: 0
FACIALEXPRESSION: 0
BODYLANGUAGE: 0
BREATHING: 0
FACIALEXPRESSION: 0 - SMILING OR INEXPRESSIVE.
CONSOLABILITY: 0
CONSOLABILITY: 0 - NO NEED TO CONSOLE.
BODYLANGUAGE: 0 - RELAXED.

## 2024-03-27 ASSESSMENT — ENCOUNTER SYMPTOMS
PERSON REPORTING PAIN: PATIENT
DENIES PAIN: 1
DENIES PAIN: 1
PERSON REPORTING PAIN: PATIENT

## 2024-03-28 ENCOUNTER — HOME CARE VISIT (OUTPATIENT)
Dept: HOME HEALTH SERVICES | Facility: HOME HEALTH | Age: 69
End: 2024-03-28
Payer: MEDICARE

## 2024-03-28 PROCEDURE — 1090000002 HH PPS REVENUE DEBIT

## 2024-03-28 PROCEDURE — 1090000001 HH PPS REVENUE CREDIT

## 2024-03-28 PROCEDURE — G0157 HHC PT ASSISTANT EA 15: HCPCS | Mod: HHH

## 2024-03-29 PROCEDURE — 1090000001 HH PPS REVENUE CREDIT

## 2024-03-29 PROCEDURE — 1090000002 HH PPS REVENUE DEBIT

## 2024-03-30 PROCEDURE — 1090000001 HH PPS REVENUE CREDIT

## 2024-03-30 PROCEDURE — 1090000002 HH PPS REVENUE DEBIT

## 2024-03-31 PROCEDURE — 1090000002 HH PPS REVENUE DEBIT

## 2024-03-31 PROCEDURE — 1090000001 HH PPS REVENUE CREDIT

## 2024-04-01 PROCEDURE — 1090000001 HH PPS REVENUE CREDIT

## 2024-04-01 PROCEDURE — 1090000002 HH PPS REVENUE DEBIT

## 2024-04-02 ENCOUNTER — HOME CARE VISIT (OUTPATIENT)
Dept: HOME HEALTH SERVICES | Facility: HOME HEALTH | Age: 69
End: 2024-04-02
Payer: MEDICARE

## 2024-04-02 PROCEDURE — G0157 HHC PT ASSISTANT EA 15: HCPCS | Mod: HHH

## 2024-04-02 PROCEDURE — 1090000002 HH PPS REVENUE DEBIT

## 2024-04-02 PROCEDURE — G0156 HHCP-SVS OF AIDE,EA 15 MIN: HCPCS | Mod: HHH

## 2024-04-02 PROCEDURE — 1090000001 HH PPS REVENUE CREDIT

## 2024-04-02 PROCEDURE — G0153 HHCP-SVS OF S/L PATH,EA 15MN: HCPCS | Mod: HHH

## 2024-04-02 ASSESSMENT — PAIN SCALES - PAIN ASSESSMENT IN ADVANCED DEMENTIA (PAINAD)
FACIALEXPRESSION: 0 - SMILING OR INEXPRESSIVE.
BODYLANGUAGE: 0 - RELAXED.
BREATHING: 0
CONSOLABILITY: 0 - NO NEED TO CONSOLE.
CONSOLABILITY: 0
NEGVOCALIZATION: 0 - NONE.
FACIALEXPRESSION: 0
TOTALSCORE: 0
NEGVOCALIZATION: 0
BODYLANGUAGE: 0

## 2024-04-02 ASSESSMENT — ENCOUNTER SYMPTOMS
DENIES PAIN: 1
DENIES PAIN: 1
PERSON REPORTING PAIN: PATIENT
PERSON REPORTING PAIN: PATIENT

## 2024-04-03 ENCOUNTER — HOME CARE VISIT (OUTPATIENT)
Dept: HOME HEALTH SERVICES | Facility: HOME HEALTH | Age: 69
End: 2024-04-03
Payer: MEDICARE

## 2024-04-03 PROCEDURE — G0158 HHC OT ASSISTANT EA 15: HCPCS | Mod: HHH

## 2024-04-03 PROCEDURE — 1090000001 HH PPS REVENUE CREDIT

## 2024-04-03 PROCEDURE — 1090000002 HH PPS REVENUE DEBIT

## 2024-04-03 ASSESSMENT — ENCOUNTER SYMPTOMS
PERSON REPORTING PAIN: PATIENT
DENIES PAIN: 1

## 2024-04-04 ENCOUNTER — HOME CARE VISIT (OUTPATIENT)
Dept: HOME HEALTH SERVICES | Facility: HOME HEALTH | Age: 69
End: 2024-04-04
Payer: MEDICARE

## 2024-04-04 PROCEDURE — 1090000001 HH PPS REVENUE CREDIT

## 2024-04-04 PROCEDURE — G0156 HHCP-SVS OF AIDE,EA 15 MIN: HCPCS | Mod: HHH

## 2024-04-04 PROCEDURE — 1090000002 HH PPS REVENUE DEBIT

## 2024-04-05 PROCEDURE — 1090000002 HH PPS REVENUE DEBIT

## 2024-04-05 PROCEDURE — 1090000001 HH PPS REVENUE CREDIT

## 2024-04-06 PROCEDURE — 1090000002 HH PPS REVENUE DEBIT

## 2024-04-06 PROCEDURE — 1090000001 HH PPS REVENUE CREDIT

## 2024-04-07 PROCEDURE — 1090000001 HH PPS REVENUE CREDIT

## 2024-04-07 PROCEDURE — 1090000002 HH PPS REVENUE DEBIT

## 2024-04-08 ENCOUNTER — HOME CARE VISIT (OUTPATIENT)
Dept: HOME HEALTH SERVICES | Facility: HOME HEALTH | Age: 69
End: 2024-04-08
Payer: MEDICARE

## 2024-04-08 PROCEDURE — G0156 HHCP-SVS OF AIDE,EA 15 MIN: HCPCS | Mod: HHH

## 2024-04-08 PROCEDURE — 1090000001 HH PPS REVENUE CREDIT

## 2024-04-08 PROCEDURE — 1090000002 HH PPS REVENUE DEBIT

## 2024-04-08 PROCEDURE — G0153 HHCP-SVS OF S/L PATH,EA 15MN: HCPCS | Mod: HHH

## 2024-04-08 ASSESSMENT — PAIN SCALES - PAIN ASSESSMENT IN ADVANCED DEMENTIA (PAINAD)
BREATHING: 0
FACIALEXPRESSION: 0
FACIALEXPRESSION: 0 - SMILING OR INEXPRESSIVE.
CONSOLABILITY: 0
TOTALSCORE: 0
NEGVOCALIZATION: 0 - NONE.
BODYLANGUAGE: 0
BODYLANGUAGE: 0 - RELAXED.
NEGVOCALIZATION: 0
CONSOLABILITY: 0 - NO NEED TO CONSOLE.

## 2024-04-08 ASSESSMENT — ENCOUNTER SYMPTOMS
PERSON REPORTING PAIN: PATIENT
DENIES PAIN: 1

## 2024-04-09 ENCOUNTER — HOME CARE VISIT (OUTPATIENT)
Dept: HOME HEALTH SERVICES | Facility: HOME HEALTH | Age: 69
End: 2024-04-09
Payer: MEDICARE

## 2024-04-09 PROCEDURE — G0152 HHCP-SERV OF OT,EA 15 MIN: HCPCS | Mod: HHH

## 2024-04-09 PROCEDURE — 1090000002 HH PPS REVENUE DEBIT

## 2024-04-09 PROCEDURE — 1090000001 HH PPS REVENUE CREDIT

## 2024-04-09 PROCEDURE — 1090000003 HH PPS REVENUE ADJ

## 2024-04-09 ASSESSMENT — ENCOUNTER SYMPTOMS
DENIES PAIN: 1
SUBJECTIVE PAIN PROGRESSION: WAXING AND WANING
PERSON REPORTING PAIN: PATIENT

## 2024-04-10 ENCOUNTER — HOME CARE VISIT (OUTPATIENT)
Dept: HOME HEALTH SERVICES | Facility: HOME HEALTH | Age: 69
End: 2024-04-10
Payer: MEDICARE

## 2024-04-10 VITALS — HEART RATE: 74 BPM | DIASTOLIC BLOOD PRESSURE: 60 MMHG | TEMPERATURE: 97.1 F | SYSTOLIC BLOOD PRESSURE: 110 MMHG

## 2024-04-10 PROCEDURE — 0023 HH SOC

## 2024-04-10 PROCEDURE — G0151 HHCP-SERV OF PT,EA 15 MIN: HCPCS | Mod: HHH

## 2024-04-10 PROCEDURE — 1090000002 HH PPS REVENUE DEBIT

## 2024-04-10 PROCEDURE — 1090000001 HH PPS REVENUE CREDIT

## 2024-04-10 SDOH — HEALTH STABILITY: PHYSICAL HEALTH
EXERCISE COMMENTS: INSTR IN AND PERFORMED : SUPINE : LE PNF D1 D2 DIAGONALS, SUPIONE UE CHOP AND LIFT WITH SHOULDER LIFT FOR CORE STRENGTH  INSTR IN GENTLE LTR AND RHYTHMIC STABILIZATION IN HOOKLYING   INSTR IN NEUROPLASTICITY EXERCISES USING AGELESS GRACE TECHNIQUES (

## 2024-04-10 SDOH — HEALTH STABILITY: PHYSICAL HEALTH: EXERCISE COMMENTS: SPELLING BEE)

## 2024-04-10 ASSESSMENT — ACTIVITIES OF DAILY LIVING (ADL)
AMBULATION ASSISTANCE ON FLAT SURFACES: 1
AMBULATION_DISTANCE/DURATION_TOLERATED: 100 FT

## 2024-04-10 ASSESSMENT — ENCOUNTER SYMPTOMS
DENIES PAIN: 1
OCCASIONAL FEELINGS OF UNSTEADINESS: 0

## 2024-04-11 ENCOUNTER — HOME CARE VISIT (OUTPATIENT)
Dept: HOME HEALTH SERVICES | Facility: HOME HEALTH | Age: 69
End: 2024-04-11
Payer: MEDICARE

## 2024-04-11 PROCEDURE — 1090000001 HH PPS REVENUE CREDIT

## 2024-04-11 PROCEDURE — 1090000002 HH PPS REVENUE DEBIT

## 2024-04-11 PROCEDURE — G0157 HHC PT ASSISTANT EA 15: HCPCS | Mod: CQ,HHH

## 2024-04-11 PROCEDURE — G0156 HHCP-SVS OF AIDE,EA 15 MIN: HCPCS | Mod: HHH

## 2024-04-12 ENCOUNTER — HOME CARE VISIT (OUTPATIENT)
Dept: HOME HEALTH SERVICES | Facility: HOME HEALTH | Age: 69
End: 2024-04-12
Payer: MEDICARE

## 2024-04-12 VITALS — HEART RATE: 80 BPM | OXYGEN SATURATION: 100 %

## 2024-04-12 PROCEDURE — 1090000002 HH PPS REVENUE DEBIT

## 2024-04-12 PROCEDURE — 1090000001 HH PPS REVENUE CREDIT

## 2024-04-12 PROCEDURE — G0153 HHCP-SVS OF S/L PATH,EA 15MN: HCPCS | Mod: HHH

## 2024-04-12 ASSESSMENT — PAIN SCALES - PAIN ASSESSMENT IN ADVANCED DEMENTIA (PAINAD)
BODYLANGUAGE: 0 - RELAXED.
NEGVOCALIZATION: 0
FACIALEXPRESSION: 0 - SMILING OR INEXPRESSIVE.
CONSOLABILITY: 0 - NO NEED TO CONSOLE.
BODYLANGUAGE: 0
BREATHING: 0
FACIALEXPRESSION: 0
CONSOLABILITY: 0
NEGVOCALIZATION: 0 - NONE.
TOTALSCORE: 0

## 2024-04-12 ASSESSMENT — ENCOUNTER SYMPTOMS
PERSON REPORTING PAIN: PATIENT
AGGRESSION WITHIN DEFINED LIMITS: 1
DENIES PAIN: 1
ANGER WITHIN DEFINED LIMITS: 1

## 2024-04-13 PROCEDURE — 1090000002 HH PPS REVENUE DEBIT

## 2024-04-13 PROCEDURE — 1090000001 HH PPS REVENUE CREDIT

## 2024-04-14 PROCEDURE — 1090000002 HH PPS REVENUE DEBIT

## 2024-04-14 PROCEDURE — 1090000001 HH PPS REVENUE CREDIT

## 2024-04-15 ENCOUNTER — HOME CARE VISIT (OUTPATIENT)
Dept: HOME HEALTH SERVICES | Facility: HOME HEALTH | Age: 69
End: 2024-04-15
Payer: MEDICARE

## 2024-04-15 PROCEDURE — 1090000001 HH PPS REVENUE CREDIT

## 2024-04-15 PROCEDURE — G0156 HHCP-SVS OF AIDE,EA 15 MIN: HCPCS | Mod: HHH

## 2024-04-15 PROCEDURE — 1090000002 HH PPS REVENUE DEBIT

## 2024-04-16 ENCOUNTER — HOME CARE VISIT (OUTPATIENT)
Dept: HOME HEALTH SERVICES | Facility: HOME HEALTH | Age: 69
End: 2024-04-16
Payer: MEDICARE

## 2024-04-16 PROCEDURE — G0157 HHC PT ASSISTANT EA 15: HCPCS | Mod: CQ,HHH

## 2024-04-16 PROCEDURE — G0153 HHCP-SVS OF S/L PATH,EA 15MN: HCPCS | Mod: HHH

## 2024-04-16 PROCEDURE — 1090000001 HH PPS REVENUE CREDIT

## 2024-04-16 PROCEDURE — 1090000002 HH PPS REVENUE DEBIT

## 2024-04-16 ASSESSMENT — PAIN SCALES - PAIN ASSESSMENT IN ADVANCED DEMENTIA (PAINAD)
CONSOLABILITY: 0 - NO NEED TO CONSOLE.
BODYLANGUAGE: 0
TOTALSCORE: 0
FACIALEXPRESSION: 0
BREATHING: 0
CONSOLABILITY: 0
NEGVOCALIZATION: 0
NEGVOCALIZATION: 0 - NONE.
BODYLANGUAGE: 0 - RELAXED.
FACIALEXPRESSION: 0 - SMILING OR INEXPRESSIVE.

## 2024-04-16 ASSESSMENT — ENCOUNTER SYMPTOMS
PERSON REPORTING PAIN: PATIENT
DENIES PAIN: 1

## 2024-04-17 ENCOUNTER — HOME CARE VISIT (OUTPATIENT)
Dept: HOME HEALTH SERVICES | Facility: HOME HEALTH | Age: 69
End: 2024-04-17
Payer: MEDICARE

## 2024-04-17 PROCEDURE — 1090000001 HH PPS REVENUE CREDIT

## 2024-04-17 PROCEDURE — 1090000002 HH PPS REVENUE DEBIT

## 2024-04-17 PROCEDURE — G0158 HHC OT ASSISTANT EA 15: HCPCS | Mod: CO,HHH

## 2024-04-18 ENCOUNTER — HOME CARE VISIT (OUTPATIENT)
Dept: HOME HEALTH SERVICES | Facility: HOME HEALTH | Age: 69
End: 2024-04-18
Payer: MEDICARE

## 2024-04-18 VITALS — OXYGEN SATURATION: 99 % | HEART RATE: 72 BPM

## 2024-04-18 PROCEDURE — G0157 HHC PT ASSISTANT EA 15: HCPCS | Mod: CQ,HHH

## 2024-04-18 PROCEDURE — G0153 HHCP-SVS OF S/L PATH,EA 15MN: HCPCS | Mod: HHH

## 2024-04-18 PROCEDURE — 1090000002 HH PPS REVENUE DEBIT

## 2024-04-18 PROCEDURE — G0156 HHCP-SVS OF AIDE,EA 15 MIN: HCPCS | Mod: HHH

## 2024-04-18 PROCEDURE — 1090000001 HH PPS REVENUE CREDIT

## 2024-04-18 ASSESSMENT — PAIN SCALES - PAIN ASSESSMENT IN ADVANCED DEMENTIA (PAINAD)
FACIALEXPRESSION: 0 - SMILING OR INEXPRESSIVE.
NEGVOCALIZATION: 0 - NONE.
NEGVOCALIZATION: 0
TOTALSCORE: 0
BODYLANGUAGE: 0
BODYLANGUAGE: 0 - RELAXED.
FACIALEXPRESSION: 0
CONSOLABILITY: 0
BREATHING: 0
CONSOLABILITY: 0 - NO NEED TO CONSOLE.

## 2024-04-18 ASSESSMENT — ENCOUNTER SYMPTOMS
DENIES PAIN: 1
PERSON REPORTING PAIN: PATIENT

## 2024-04-19 VITALS — TEMPERATURE: 97.8 F

## 2024-04-19 PROCEDURE — 1090000001 HH PPS REVENUE CREDIT

## 2024-04-19 PROCEDURE — 1090000002 HH PPS REVENUE DEBIT

## 2024-04-19 ASSESSMENT — ENCOUNTER SYMPTOMS
DENIES PAIN: 1
PERSON REPORTING PAIN: PATIENT

## 2024-04-20 PROCEDURE — 1090000001 HH PPS REVENUE CREDIT

## 2024-04-20 PROCEDURE — 1090000002 HH PPS REVENUE DEBIT

## 2024-04-21 PROCEDURE — 1090000002 HH PPS REVENUE DEBIT

## 2024-04-21 PROCEDURE — 1090000001 HH PPS REVENUE CREDIT

## 2024-04-22 ENCOUNTER — HOME CARE VISIT (OUTPATIENT)
Dept: HOME HEALTH SERVICES | Facility: HOME HEALTH | Age: 69
End: 2024-04-22
Payer: MEDICARE

## 2024-04-22 PROCEDURE — G0156 HHCP-SVS OF AIDE,EA 15 MIN: HCPCS | Mod: HHH

## 2024-04-22 PROCEDURE — 1090000002 HH PPS REVENUE DEBIT

## 2024-04-22 PROCEDURE — 1090000001 HH PPS REVENUE CREDIT

## 2024-04-23 ENCOUNTER — HOME CARE VISIT (OUTPATIENT)
Dept: HOME HEALTH SERVICES | Facility: HOME HEALTH | Age: 69
End: 2024-04-23
Payer: MEDICARE

## 2024-04-23 VITALS — SYSTOLIC BLOOD PRESSURE: 120 MMHG | TEMPERATURE: 98.5 F | DIASTOLIC BLOOD PRESSURE: 60 MMHG | HEART RATE: 85 BPM

## 2024-04-23 PROCEDURE — 1090000001 HH PPS REVENUE CREDIT

## 2024-04-23 PROCEDURE — G0151 HHCP-SERV OF PT,EA 15 MIN: HCPCS | Mod: HHH

## 2024-04-23 PROCEDURE — 1090000002 HH PPS REVENUE DEBIT

## 2024-04-23 SDOH — HEALTH STABILITY: PHYSICAL HEALTH
EXERCISE COMMENTS: SUPINE: PNF UE CHOP AND LIFT , LE PNF D1 AND D2 DIAGONAL WITH MANUAL FEEDBACK, INSTR TO DO UE PNF DAILY TO IMPROVE CORE AND TRUNK DISASSOCIATION.

## 2024-04-23 ASSESSMENT — ACTIVITIES OF DAILY LIVING (ADL)
AMBULATION ASSISTANCE ON FLAT SURFACES: 1
AMBULATION_DISTANCE/DURATION_TOLERATED: 150 FT

## 2024-04-23 ASSESSMENT — ENCOUNTER SYMPTOMS: DENIES PAIN: 1

## 2024-04-24 ENCOUNTER — HOME CARE VISIT (OUTPATIENT)
Dept: HOME HEALTH SERVICES | Facility: HOME HEALTH | Age: 69
End: 2024-04-24
Payer: MEDICARE

## 2024-04-24 PROCEDURE — G0153 HHCP-SVS OF S/L PATH,EA 15MN: HCPCS | Mod: HHH

## 2024-04-24 PROCEDURE — 1090000002 HH PPS REVENUE DEBIT

## 2024-04-24 PROCEDURE — 1090000001 HH PPS REVENUE CREDIT

## 2024-04-24 ASSESSMENT — PAIN SCALES - PAIN ASSESSMENT IN ADVANCED DEMENTIA (PAINAD)
CONSOLABILITY: 0 - NO NEED TO CONSOLE.
NEGVOCALIZATION: 0
FACIALEXPRESSION: 0
BODYLANGUAGE: 0 - RELAXED.
CONSOLABILITY: 0
FACIALEXPRESSION: 0 - SMILING OR INEXPRESSIVE.
BREATHING: 0
TOTALSCORE: 0
NEGVOCALIZATION: 0 - NONE.
BODYLANGUAGE: 0

## 2024-04-24 ASSESSMENT — ENCOUNTER SYMPTOMS
PERSON REPORTING PAIN: PATIENT
DENIES PAIN: 1

## 2024-04-25 ENCOUNTER — HOME CARE VISIT (OUTPATIENT)
Dept: HOME HEALTH SERVICES | Facility: HOME HEALTH | Age: 69
End: 2024-04-25
Payer: MEDICARE

## 2024-04-25 PROCEDURE — 1090000001 HH PPS REVENUE CREDIT

## 2024-04-25 PROCEDURE — G0156 HHCP-SVS OF AIDE,EA 15 MIN: HCPCS | Mod: HHH

## 2024-04-25 PROCEDURE — G0157 HHC PT ASSISTANT EA 15: HCPCS | Mod: CQ,HHH

## 2024-04-25 PROCEDURE — 1090000002 HH PPS REVENUE DEBIT

## 2024-04-26 ENCOUNTER — HOME CARE VISIT (OUTPATIENT)
Dept: HOME HEALTH SERVICES | Facility: HOME HEALTH | Age: 69
End: 2024-04-26
Payer: MEDICARE

## 2024-04-26 VITALS — OXYGEN SATURATION: 98 % | HEART RATE: 74 BPM

## 2024-04-26 PROCEDURE — 1090000001 HH PPS REVENUE CREDIT

## 2024-04-26 PROCEDURE — G0153 HHCP-SVS OF S/L PATH,EA 15MN: HCPCS | Mod: HHH

## 2024-04-26 PROCEDURE — G0158 HHC OT ASSISTANT EA 15: HCPCS | Mod: CO,HHH

## 2024-04-26 PROCEDURE — 1090000002 HH PPS REVENUE DEBIT

## 2024-04-26 ASSESSMENT — PAIN SCALES - PAIN ASSESSMENT IN ADVANCED DEMENTIA (PAINAD)
NEGVOCALIZATION: 0
BREATHING: 0
NEGVOCALIZATION: 0 - NONE.
BODYLANGUAGE: 0 - RELAXED.
TOTALSCORE: 0
FACIALEXPRESSION: 0
FACIALEXPRESSION: 0 - SMILING OR INEXPRESSIVE.
BODYLANGUAGE: 0
CONSOLABILITY: 0 - NO NEED TO CONSOLE.
CONSOLABILITY: 0

## 2024-04-26 ASSESSMENT — ENCOUNTER SYMPTOMS
DENIES PAIN: 1
PERSON REPORTING PAIN: PATIENT
PERSON REPORTING PAIN: PATIENT
DENIES PAIN: 1

## 2024-04-27 PROCEDURE — 1090000001 HH PPS REVENUE CREDIT

## 2024-04-27 PROCEDURE — 1090000002 HH PPS REVENUE DEBIT

## 2024-04-28 PROCEDURE — 1090000001 HH PPS REVENUE CREDIT

## 2024-04-28 PROCEDURE — 1090000002 HH PPS REVENUE DEBIT

## 2024-04-29 ENCOUNTER — HOME CARE VISIT (OUTPATIENT)
Dept: HOME HEALTH SERVICES | Facility: HOME HEALTH | Age: 69
End: 2024-04-29
Payer: MEDICARE

## 2024-04-29 PROCEDURE — 1090000002 HH PPS REVENUE DEBIT

## 2024-04-29 PROCEDURE — G0156 HHCP-SVS OF AIDE,EA 15 MIN: HCPCS | Mod: HHH

## 2024-04-29 PROCEDURE — 1090000001 HH PPS REVENUE CREDIT

## 2024-04-30 ENCOUNTER — HOME CARE VISIT (OUTPATIENT)
Dept: HOME HEALTH SERVICES | Facility: HOME HEALTH | Age: 69
End: 2024-04-30
Payer: MEDICARE

## 2024-04-30 PROCEDURE — 1090000002 HH PPS REVENUE DEBIT

## 2024-04-30 PROCEDURE — 1090000001 HH PPS REVENUE CREDIT

## 2024-04-30 PROCEDURE — G0157 HHC PT ASSISTANT EA 15: HCPCS | Mod: CQ,HHH

## 2024-04-30 ASSESSMENT — ENCOUNTER SYMPTOMS
PERSON REPORTING PAIN: PATIENT
DENIES PAIN: 1

## 2024-05-01 ENCOUNTER — HOME CARE VISIT (OUTPATIENT)
Dept: HOME HEALTH SERVICES | Facility: HOME HEALTH | Age: 69
End: 2024-05-01
Payer: MEDICARE

## 2024-05-01 PROCEDURE — G0153 HHCP-SVS OF S/L PATH,EA 15MN: HCPCS | Mod: HHH

## 2024-05-01 PROCEDURE — 1090000002 HH PPS REVENUE DEBIT

## 2024-05-01 PROCEDURE — 1090000001 HH PPS REVENUE CREDIT

## 2024-05-01 ASSESSMENT — PAIN SCALES - PAIN ASSESSMENT IN ADVANCED DEMENTIA (PAINAD)
NEGVOCALIZATION: 0 - NONE.
NEGVOCALIZATION: 0
BODYLANGUAGE: 0
FACIALEXPRESSION: 0
CONSOLABILITY: 0
TOTALSCORE: 0
FACIALEXPRESSION: 0 - SMILING OR INEXPRESSIVE.
BREATHING: 0
CONSOLABILITY: 0 - NO NEED TO CONSOLE.
BODYLANGUAGE: 0 - RELAXED.

## 2024-05-01 ASSESSMENT — ENCOUNTER SYMPTOMS
PERSON REPORTING PAIN: PATIENT
DENIES PAIN: 1

## 2024-05-02 ENCOUNTER — HOME CARE VISIT (OUTPATIENT)
Dept: HOME HEALTH SERVICES | Facility: HOME HEALTH | Age: 69
End: 2024-05-02
Payer: MEDICARE

## 2024-05-02 PROCEDURE — 1090000001 HH PPS REVENUE CREDIT

## 2024-05-02 PROCEDURE — 1090000002 HH PPS REVENUE DEBIT

## 2024-05-02 PROCEDURE — G0157 HHC PT ASSISTANT EA 15: HCPCS | Mod: CQ,HHH

## 2024-05-02 PROCEDURE — G0156 HHCP-SVS OF AIDE,EA 15 MIN: HCPCS | Mod: HHH

## 2024-05-03 ENCOUNTER — HOME CARE VISIT (OUTPATIENT)
Dept: HOME HEALTH SERVICES | Facility: HOME HEALTH | Age: 69
End: 2024-05-03
Payer: MEDICARE

## 2024-05-03 PROCEDURE — 1090000002 HH PPS REVENUE DEBIT

## 2024-05-03 PROCEDURE — G0152 HHCP-SERV OF OT,EA 15 MIN: HCPCS | Mod: HHH | Performed by: OCCUPATIONAL THERAPIST

## 2024-05-03 PROCEDURE — G0153 HHCP-SVS OF S/L PATH,EA 15MN: HCPCS | Mod: HHH

## 2024-05-03 PROCEDURE — 1090000001 HH PPS REVENUE CREDIT

## 2024-05-03 ASSESSMENT — PAIN SCALES - PAIN ASSESSMENT IN ADVANCED DEMENTIA (PAINAD)
TOTALSCORE: 0
NEGVOCALIZATION: 0
FACIALEXPRESSION: 0 - SMILING OR INEXPRESSIVE.
BODYLANGUAGE: 0 - RELAXED.
CONSOLABILITY: 0
FACIALEXPRESSION: 0
BODYLANGUAGE: 0
BREATHING: 0
NEGVOCALIZATION: 0 - NONE.
CONSOLABILITY: 0 - NO NEED TO CONSOLE.

## 2024-05-03 ASSESSMENT — ENCOUNTER SYMPTOMS
DENIES PAIN: 1
PERSON REPORTING PAIN: PATIENT
PERSON REPORTING PAIN: PATIENT

## 2024-05-04 PROCEDURE — 1090000001 HH PPS REVENUE CREDIT

## 2024-05-04 PROCEDURE — 1090000002 HH PPS REVENUE DEBIT

## 2024-05-05 PROCEDURE — 1090000002 HH PPS REVENUE DEBIT

## 2024-05-05 PROCEDURE — 1090000001 HH PPS REVENUE CREDIT

## 2024-05-06 ENCOUNTER — HOME CARE VISIT (OUTPATIENT)
Dept: HOME HEALTH SERVICES | Facility: HOME HEALTH | Age: 69
End: 2024-05-06
Payer: MEDICARE

## 2024-05-06 PROCEDURE — G0153 HHCP-SVS OF S/L PATH,EA 15MN: HCPCS | Mod: HHH

## 2024-05-06 PROCEDURE — 1090000001 HH PPS REVENUE CREDIT

## 2024-05-06 PROCEDURE — 1090000002 HH PPS REVENUE DEBIT

## 2024-05-06 PROCEDURE — G0156 HHCP-SVS OF AIDE,EA 15 MIN: HCPCS | Mod: HHH

## 2024-05-06 ASSESSMENT — ENCOUNTER SYMPTOMS
PERSON REPORTING PAIN: PATIENT
DENIES PAIN: 1

## 2024-05-06 ASSESSMENT — PAIN SCALES - PAIN ASSESSMENT IN ADVANCED DEMENTIA (PAINAD)
NEGVOCALIZATION: 0
FACIALEXPRESSION: 0 - SMILING OR INEXPRESSIVE.
BODYLANGUAGE: 0 - RELAXED.
BREATHING: 0
FACIALEXPRESSION: 0
NEGVOCALIZATION: 0 - NONE.
TOTALSCORE: 0
CONSOLABILITY: 0 - NO NEED TO CONSOLE.
BODYLANGUAGE: 0
CONSOLABILITY: 0

## 2024-05-07 ENCOUNTER — HOME CARE VISIT (OUTPATIENT)
Dept: HOME HEALTH SERVICES | Facility: HOME HEALTH | Age: 69
End: 2024-05-07
Payer: MEDICARE

## 2024-05-07 VITALS — HEART RATE: 82 BPM | OXYGEN SATURATION: 100 %

## 2024-05-07 PROCEDURE — 1090000002 HH PPS REVENUE DEBIT

## 2024-05-07 PROCEDURE — G0153 HHCP-SVS OF S/L PATH,EA 15MN: HCPCS | Mod: HHH

## 2024-05-07 PROCEDURE — 1090000001 HH PPS REVENUE CREDIT

## 2024-05-07 ASSESSMENT — PAIN SCALES - PAIN ASSESSMENT IN ADVANCED DEMENTIA (PAINAD)
FACIALEXPRESSION: 0 - SMILING OR INEXPRESSIVE.
NEGVOCALIZATION: 0
BODYLANGUAGE: 0 - RELAXED.
CONSOLABILITY: 0 - NO NEED TO CONSOLE.
NEGVOCALIZATION: 0 - NONE.
BODYLANGUAGE: 0
CONSOLABILITY: 0
TOTALSCORE: 0
BREATHING: 0
FACIALEXPRESSION: 0

## 2024-05-07 ASSESSMENT — ENCOUNTER SYMPTOMS
DENIES PAIN: 1
PERSON REPORTING PAIN: PATIENT

## 2024-05-08 PROCEDURE — 1090000002 HH PPS REVENUE DEBIT

## 2024-05-08 PROCEDURE — 1090000001 HH PPS REVENUE CREDIT

## 2024-05-09 ENCOUNTER — HOME CARE VISIT (OUTPATIENT)
Dept: HOME HEALTH SERVICES | Facility: HOME HEALTH | Age: 69
End: 2024-05-09
Payer: MEDICARE

## 2024-05-09 VITALS — DIASTOLIC BLOOD PRESSURE: 70 MMHG | SYSTOLIC BLOOD PRESSURE: 110 MMHG | TEMPERATURE: 97.4 F | HEART RATE: 75 BPM

## 2024-05-09 PROCEDURE — 1090000001 HH PPS REVENUE CREDIT

## 2024-05-09 PROCEDURE — G0156 HHCP-SVS OF AIDE,EA 15 MIN: HCPCS | Mod: HHH

## 2024-05-09 PROCEDURE — 1090000003 HH PPS REVENUE ADJ

## 2024-05-09 PROCEDURE — 1090000002 HH PPS REVENUE DEBIT

## 2024-05-09 PROCEDURE — G0151 HHCP-SERV OF PT,EA 15 MIN: HCPCS | Mod: HHH

## 2024-05-09 SDOH — HEALTH STABILITY: PHYSICAL HEALTH: EXERCISE COMMENTS: ASSESSED CARRYOVER OF HEP WITH INSTR FOR CONT FUNCTIONAL GAINS AND LONG TERM COMPLIANCE

## 2024-05-09 ASSESSMENT — ACTIVITIES OF DAILY LIVING (ADL)
AMBULATION ASSISTANCE ON FLAT SURFACES: 1
HOME_HEALTH_OASIS: 01
OASIS_M1830: 03
AMBULATION_DISTANCE/DURATION_TOLERATED: 150

## 2024-05-09 ASSESSMENT — ENCOUNTER SYMPTOMS
DEPRESSION: 0
LOSS OF SENSATION IN FEET: 0
OCCASIONAL FEELINGS OF UNSTEADINESS: 1
DENIES PAIN: 1

## 2024-06-24 ENCOUNTER — APPOINTMENT (OUTPATIENT)
Dept: PRIMARY CARE | Facility: CLINIC | Age: 69
End: 2024-06-24
Payer: MEDICARE

## 2024-06-24 ENCOUNTER — TELEPHONE (OUTPATIENT)
Dept: PRIMARY CARE | Facility: CLINIC | Age: 69
End: 2024-06-24

## 2024-07-12 ENCOUNTER — APPOINTMENT (OUTPATIENT)
Dept: PRIMARY CARE | Facility: CLINIC | Age: 69
End: 2024-07-12
Payer: MEDICARE

## 2024-08-16 ENCOUNTER — APPOINTMENT (OUTPATIENT)
Dept: OPHTHALMOLOGY | Facility: CLINIC | Age: 69
End: 2024-08-16
Payer: MEDICARE

## 2024-09-25 ENCOUNTER — APPOINTMENT (OUTPATIENT)
Dept: DERMATOLOGY | Facility: CLINIC | Age: 69
End: 2024-09-25
Payer: MEDICARE

## 2024-09-25 DIAGNOSIS — L21.9 SEBORRHEIC DERMATITIS: ICD-10-CM

## 2024-09-25 DIAGNOSIS — D22.9 NEVUS: Primary | ICD-10-CM

## 2024-09-25 DIAGNOSIS — D48.5 NEOPLASM OF UNCERTAIN BEHAVIOR OF SKIN: ICD-10-CM

## 2024-09-25 DIAGNOSIS — L81.4 LENTIGO: ICD-10-CM

## 2024-09-25 DIAGNOSIS — Z12.83 SCREENING EXAM FOR SKIN CANCER: ICD-10-CM

## 2024-09-25 DIAGNOSIS — L82.1 SEBORRHEIC KERATOSIS: ICD-10-CM

## 2024-09-25 RX ORDER — KETOCONAZOLE 20 MG/ML
SHAMPOO, SUSPENSION TOPICAL EVERY OTHER DAY
Qty: 120 ML | Refills: 3 | Status: SHIPPED | OUTPATIENT
Start: 2024-09-25

## 2024-09-25 RX ORDER — CLOBETASOL PROPIONATE 0.5 MG/ML
SOLUTION TOPICAL 2 TIMES DAILY
Qty: 50 ML | Refills: 3 | Status: SHIPPED | OUTPATIENT
Start: 2024-09-25 | End: 2024-10-09

## 2024-10-01 LAB
LABORATORY COMMENT REPORT: NORMAL
PATH REPORT.FINAL DX SPEC: NORMAL
PATH REPORT.GROSS SPEC: NORMAL
PATH REPORT.MICROSCOPIC SPEC OTHER STN: NORMAL
PATH REPORT.RELEVANT HX SPEC: NORMAL
PATH REPORT.TOTAL CANCER: NORMAL

## 2024-10-08 ENCOUNTER — TELEPHONE (OUTPATIENT)
Dept: DERMATOLOGY | Facility: CLINIC | Age: 69
End: 2024-10-08
Payer: MEDICARE

## 2024-10-08 NOTE — TELEPHONE ENCOUNTER
Patient's wife called and left message asking for details in regards to the clobetasol topical patient's wife asked if she is able to put this medication on prior to the surgery of the basal cell on his scalp.  Returned call at this time no answer left message detailing that it would be okay for them to begin using the clobetasol scalp solution for the seborrheic dermatitis on the patient's scalp.

## 2024-10-27 ENCOUNTER — APPOINTMENT (OUTPATIENT)
Dept: RADIOLOGY | Facility: HOSPITAL | Age: 69
End: 2024-10-27
Payer: MEDICARE

## 2024-10-27 ENCOUNTER — HOSPITAL ENCOUNTER (EMERGENCY)
Facility: HOSPITAL | Age: 69
Discharge: HOME | End: 2024-10-27
Attending: STUDENT IN AN ORGANIZED HEALTH CARE EDUCATION/TRAINING PROGRAM
Payer: MEDICARE

## 2024-10-27 VITALS
DIASTOLIC BLOOD PRESSURE: 68 MMHG | HEIGHT: 69 IN | BODY MASS INDEX: 31.35 KG/M2 | HEART RATE: 68 BPM | WEIGHT: 211.64 LBS | RESPIRATION RATE: 16 BRPM | OXYGEN SATURATION: 96 % | TEMPERATURE: 97.3 F | SYSTOLIC BLOOD PRESSURE: 106 MMHG

## 2024-10-27 DIAGNOSIS — S11.91XA LACERATION OF NECK, INITIAL ENCOUNTER: ICD-10-CM
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
## 2024-10-27 PROCEDURE — 72170 X-RAY EXAM OF PELVIS: CPT | Performed by: RADIOLOGY

## 2024-10-27 PROCEDURE — 72125 CT NECK SPINE W/O DYE: CPT | Performed by: RADIOLOGY

## 2024-10-27 PROCEDURE — 12001 RPR S/N/AX/GEN/TRNK 2.5CM/<: CPT

## 2024-10-27 PROCEDURE — 99285 EMERGENCY DEPT VISIT HI MDM: CPT | Mod: 25

## 2024-10-27 PROCEDURE — 72170 X-RAY EXAM OF PELVIS: CPT

## 2024-10-27 PROCEDURE — 73552 X-RAY EXAM OF FEMUR 2/>: CPT | Mod: RT

## 2024-10-27 PROCEDURE — 73552 X-RAY EXAM OF FEMUR 2/>: CPT | Mod: RIGHT SIDE | Performed by: RADIOLOGY

## 2024-10-27 PROCEDURE — 72125 CT NECK SPINE W/O DYE: CPT

## 2024-10-27 PROCEDURE — 2500000001 HC RX 250 WO HCPCS SELF ADMINISTERED DRUGS (ALT 637 FOR MEDICARE OP)

## 2024-10-27 PROCEDURE — 70450 CT HEAD/BRAIN W/O DYE: CPT

## 2024-10-27 PROCEDURE — 70450 CT HEAD/BRAIN W/O DYE: CPT | Performed by: RADIOLOGY

## 2024-10-27 RX ADMIN — Medication 3 ML: at 03:01

## 2024-10-27 ASSESSMENT — PAIN SCALES - GENERAL
PAINLEVEL_OUTOF10: 0 - NO PAIN

## 2024-10-27 ASSESSMENT — LIFESTYLE VARIABLES
EVER FELT BAD OR GUILTY ABOUT YOUR DRINKING: NO
HAVE PEOPLE ANNOYED YOU BY CRITICIZING YOUR DRINKING: NO
HAVE YOU EVER FELT YOU SHOULD CUT DOWN ON YOUR DRINKING: NO
TOTAL SCORE: 0
EVER HAD A DRINK FIRST THING IN THE MORNING TO STEADY YOUR NERVES TO GET RID OF A HANGOVER: NO

## 2024-10-27 ASSESSMENT — COLUMBIA-SUICIDE SEVERITY RATING SCALE - C-SSRS
2. HAVE YOU ACTUALLY HAD ANY THOUGHTS OF KILLING YOURSELF?: NO
1. IN THE PAST MONTH, HAVE YOU WISHED YOU WERE DEAD OR WISHED YOU COULD GO TO SLEEP AND NOT WAKE UP?: NO
6. HAVE YOU EVER DONE ANYTHING, STARTED TO DO ANYTHING, OR PREPARED TO DO ANYTHING TO END YOUR LIFE?: NO

## 2024-10-27 ASSESSMENT — PAIN - FUNCTIONAL ASSESSMENT: PAIN_FUNCTIONAL_ASSESSMENT: 0-10

## 2024-11-04 NOTE — PROGRESS NOTES
"Assessment and Plan    10/27/2024 CT head without contrast, which I personally reviewed, shows no acute lesion.    11/17/2021 MRI brain with contrast, which I personally reviewed previously, shows the \"hummingbird sign\" of midsagittal midbrian atrophy.  Prior head imaging.    Lab Results   Component Value Date/Time    SEDRATE 5 01/26/2022 1036    HSCRP 3.1 (A) 06/01/2022 1236    HSCRP 1.8 01/26/2022 1036     Lab Results   Component Value Date/Time    XPCAAHHJ31 2,442 (H) 06/20/2023 1011    MPXCFFHP64 600 01/26/2022 1036    XHZCONJP83 237 11/10/2021 1057     07/08/2014 CRP 0.15 mg/dL.  06/30/2014 B12 673.    This 69 year-old man with a history of progressive supranuclear palsy, convergence insufficiency, keratoconus, pre-DM2, RBBB, obesity, former smoker, OA, iron deficiency anemia, GERD presents for evaluation of progressive supranuclear palsy.    The misalignment of exo worse at near remains about the same. Saccades have worsened as expected with progressive supranuclear palsy (PSP).    As before, the right eye vision complaint appears refractive and possibly worse in the setting of keratoconus and dry eye.    Plan    Continue artificial years.  Occlusion as needed.  See optometrist.  Continue care with neurologist Dr. Morris Pavon.  Consider laser capsulotomy.    Follow up as needed with stereo plates. (dilated 12/02/2022)    "

## 2024-11-05 ENCOUNTER — APPOINTMENT (OUTPATIENT)
Dept: OPHTHALMOLOGY | Facility: CLINIC | Age: 69
End: 2024-11-05
Payer: MEDICARE

## 2024-11-05 DIAGNOSIS — G23.1 PSP (PROGRESSIVE SUPRANUCLEAR PALSY) (MULTI): Primary | ICD-10-CM

## 2024-11-05 DIAGNOSIS — H51.11 CONVERGENCE INSUFFICIENCY: ICD-10-CM

## 2024-11-05 RX ORDER — BACLOFEN 10 MG/1
10 TABLET ORAL DAILY
COMMUNITY

## 2024-11-05 RX ORDER — CLOBETASOL PROPIONATE 0.5 MG/ML
SOLUTION TOPICAL
COMMUNITY
Start: 2024-09-25

## 2024-11-05 ASSESSMENT — CUP TO DISC RATIO
OD_RATIO: .3
OS_RATIO: .3

## 2024-11-05 ASSESSMENT — REFRACTION_WEARINGRX
OD_ADD: +2.50
OS_CYLINDER: -0.25
OD_SPHERE: +2.50
OD_CYLINDER: -2.50
OS_ADD: +2.50
OS_SPHERE: PLANO
OD_CYLINDER: -2.50
OD_AXIS: 050
OD_SPHERE: +5.75
OS_CYLINDER: -0.25
OS_SPHERE: +3.25
OS_AXIS: 015
OD_AXIS: 048
OS_AXIS: 002

## 2024-11-05 ASSESSMENT — SLIT LAMP EXAM - LIDS
COMMENTS: NORMAL
COMMENTS: NORMAL

## 2024-11-05 ASSESSMENT — ENCOUNTER SYMPTOMS
HEMATOLOGIC/LYMPHATIC NEGATIVE: 0
GASTROINTESTINAL NEGATIVE: 0
ALLERGIC/IMMUNOLOGIC NEGATIVE: 0
CONSTITUTIONAL NEGATIVE: 0
PSYCHIATRIC NEGATIVE: 0
MUSCULOSKELETAL NEGATIVE: 0
RESPIRATORY NEGATIVE: 0
NEUROLOGICAL NEGATIVE: 0
CARDIOVASCULAR NEGATIVE: 0
ENDOCRINE NEGATIVE: 0
EYES NEGATIVE: 1

## 2024-11-05 ASSESSMENT — EXTERNAL EXAM - RIGHT EYE: OD_EXAM: NORMAL

## 2024-11-05 ASSESSMENT — TONOMETRY
OD_IOP_MMHG: 13
IOP_METHOD: TONOPEN
OS_IOP_MMHG: 16

## 2024-11-05 ASSESSMENT — VISUAL ACUITY
OD_CC: 20/200
OD_PH_CC: UTO
CORRECTION_TYPE: GLASSES
OS_PH_CC: UTO
OS_CC: 20/30

## 2024-11-05 ASSESSMENT — EXTERNAL EXAM - LEFT EYE: OS_EXAM: NORMAL

## 2024-11-05 ASSESSMENT — CONF VISUAL FIELD: COMMENTS: UTO

## 2024-11-26 ENCOUNTER — APPOINTMENT (OUTPATIENT)
Dept: DERMATOLOGY | Facility: CLINIC | Age: 69
End: 2024-11-26
Payer: MEDICARE

## 2024-12-03 ENCOUNTER — APPOINTMENT (OUTPATIENT)
Dept: DERMATOLOGY | Facility: CLINIC | Age: 69
End: 2024-12-03
Payer: MEDICARE

## 2024-12-03 VITALS — DIASTOLIC BLOOD PRESSURE: 83 MMHG | HEART RATE: 71 BPM | SYSTOLIC BLOOD PRESSURE: 118 MMHG

## 2024-12-03 DIAGNOSIS — C44.41 BASAL CELL CARCINOMA (BCC) OF SCALP: Primary | ICD-10-CM

## 2024-12-03 NOTE — PROGRESS NOTES
Office Visit Note  Date: 12/3/2024  Surgeon:  Sadi George MD PhD  Office Location: [address]  [address]  [facility] [address]  Dept: [phone]  Dept Fax: [phone]  Referring Provider: Carol Sellers, APRN-JUSTIN  [address]  [address]  [place] [address]    Subjective   Jony Panda is a 69 y.o. male who presents with his wife (primary caretaker) for the following: infiltrative and nodular BCC of the mid parietal scalp. Wife notes that given the patient's diagnosis of progressive supranuclear palsy, he has been on hospice care for about 6 months now. They have been focused on comfort care only.    According to the patient and wife, the lesion has been present for approximately greater than 1 year at the time of diagnosis. Wife notes that prior to the lesion being biopsied on 9/25/24, patient had been scratching at the area often. Since then, the biopsy site has healed over and the area is no longer crusting and bleeding. Wife reports that patient no longer manipulates the area. Patient himself denies any pain or itching of the site. The lesion has not been treated previously.    The patient does not have a pacemaker / defibrillator.  The patient does have a heart valve / joint replacement.  Bilateral shoulders, right knee, all >6 months ago  The patient is not on blood thinners.  The patient does not have a history of hepatitis B or C.  The patient does not have a history of HIV.  The patient does not have a history of immunosuppression (e.g. organ transplantation, malignancy, medications)    Review of Systems:  No other skin or systemic complaints other than what is documented elsewhere in the note.    MEDICAL HISTORY: clinically relevant history including significant past medical history, medications and allergies was reviewed and documented in Epic.    Objective   Well appearing patient in no apparent distress; pt required transfer to the table from his wheel  chair, his movements are minimal and slow, his speech is halting and quiet. He is alert and oriented x 3 despite the challenges with communication.  Vital signs: See record.  Noted on the Mid Parietal Scalp  Pink atrophic 1 cm papule at site of previous biopsy with no ulceration nor persistent neoplasm.    The patient's wife, caregiver, confirmed the identified site.    Assessment and Plan:  + Small infiltrative BCC in the setting of comfort care on the scalp.  ++ The lesion appears to have been debulked by the biopsy  ++ Expect persistence of this lesion, but the regrowth will likely not impact the patient's comfort for 3-5 years.  + Discussion:  To both the patient and his wife, the nature of the diagnosis and prognosis was explained.    ++ Given that the patient is on hospice, we discussed the benefits and risks of not operating today as well. We reviewed that currently since the lesion is asymptomatic and non-bleeding, the benefit of Mohs surgery at this time is low - especially given that he would be left with likely post-surgical pain that would be more bothersome than the current lesion.  ++ The patient and his wife selected deferring surgery until the lesions becomes bothersome.   ++ Provided patient and wife with our direct phone number, if spot becomes bothersome we are happy to treat the site for comfort, but curative treatment does not seem to be in the care plan at this time. Patient and wife confirmed understanding were invited to give us a call if we can help with comfort in any way.    The pathology was reviewed, the photograph was reviewed, and the referring physician's note was reviewed.    Sadi George MD, PhD  30 minutes was spent evaluating the patient and discussing treatment options with the pt.

## 2025-08-28 ENCOUNTER — LAB REQUISITION (OUTPATIENT)
Dept: LAB | Facility: HOSPITAL | Age: 70
End: 2025-08-28
Payer: MEDICARE

## 2025-08-28 DIAGNOSIS — R33.9 RETENTION OF URINE, UNSPECIFIED: ICD-10-CM

## 2025-08-28 LAB
APPEARANCE UR: ABNORMAL
BILIRUB UR STRIP.AUTO-MCNC: NEGATIVE MG/DL
CAOX CRY #/AREA UR COMP ASSIST: ABNORMAL /HPF
COLOR UR: ABNORMAL
GLUCOSE UR STRIP.AUTO-MCNC: NORMAL MG/DL
KETONES UR STRIP.AUTO-MCNC: NEGATIVE MG/DL
LEUKOCYTE ESTERASE UR QL STRIP.AUTO: ABNORMAL
MUCOUS THREADS #/AREA URNS AUTO: ABNORMAL /LPF
NITRITE UR QL STRIP.AUTO: NEGATIVE
PH UR STRIP.AUTO: 6.5 [PH]
PROT UR STRIP.AUTO-MCNC: ABNORMAL MG/DL
RBC # UR STRIP.AUTO: ABNORMAL MG/DL
RBC #/AREA URNS AUTO: >20 /HPF
SP GR UR STRIP.AUTO: 1.01
UROBILINOGEN UR STRIP.AUTO-MCNC: NORMAL MG/DL
WBC #/AREA URNS AUTO: ABNORMAL /HPF

## 2025-08-28 PROCEDURE — 81001 URINALYSIS AUTO W/SCOPE: CPT

## 2025-08-28 PROCEDURE — 87086 URINE CULTURE/COLONY COUNT: CPT

## 2025-08-29 LAB
BACTERIA UR CULT: NORMAL
HOLD SPECIMEN: NORMAL

## 2025-09-24 ENCOUNTER — APPOINTMENT (OUTPATIENT)
Dept: DERMATOLOGY | Facility: CLINIC | Age: 70
End: 2025-09-24
Payer: MEDICARE

## 2025-10-01 ENCOUNTER — APPOINTMENT (OUTPATIENT)
Dept: DERMATOLOGY | Facility: CLINIC | Age: 70
End: 2025-10-01
Payer: MEDICARE